# Patient Record
Sex: MALE | Race: WHITE | NOT HISPANIC OR LATINO | URBAN - METROPOLITAN AREA
[De-identification: names, ages, dates, MRNs, and addresses within clinical notes are randomized per-mention and may not be internally consistent; named-entity substitution may affect disease eponyms.]

---

## 2018-01-05 ENCOUNTER — EMERGENCY (EMERGENCY)
Facility: HOSPITAL | Age: 33
LOS: 0 days | Discharge: HOME | End: 2018-01-05
Attending: EMERGENCY MEDICINE | Admitting: EMERGENCY MEDICINE
Payer: MEDICAID

## 2018-01-05 VITALS — HEIGHT: 70 IN | WEIGHT: 216.05 LBS

## 2018-01-05 VITALS
HEART RATE: 84 BPM | RESPIRATION RATE: 16 BRPM | DIASTOLIC BLOOD PRESSURE: 55 MMHG | OXYGEN SATURATION: 99 % | SYSTOLIC BLOOD PRESSURE: 116 MMHG | TEMPERATURE: 98 F

## 2018-01-05 DIAGNOSIS — K52.89 OTHER SPECIFIED NONINFECTIVE GASTROENTERITIS AND COLITIS: ICD-10-CM

## 2018-01-05 DIAGNOSIS — R11.2 NAUSEA WITH VOMITING, UNSPECIFIED: ICD-10-CM

## 2018-01-05 LAB
ALBUMIN SERPL ELPH-MCNC: 4 G/DL — SIGNIFICANT CHANGE UP (ref 3.3–5)
ALP SERPL-CCNC: 55 U/L — SIGNIFICANT CHANGE UP (ref 40–120)
ALT FLD-CCNC: 37 U/L — SIGNIFICANT CHANGE UP (ref 12–78)
ANION GAP SERPL CALC-SCNC: 7 MMOL/L — SIGNIFICANT CHANGE UP (ref 5–17)
AST SERPL-CCNC: 26 U/L — SIGNIFICANT CHANGE UP (ref 15–37)
BASOPHILS # BLD AUTO: 0 K/UL — SIGNIFICANT CHANGE UP (ref 0–0.2)
BASOPHILS NFR BLD AUTO: 0 % — SIGNIFICANT CHANGE UP (ref 0–2)
BILIRUB SERPL-MCNC: 0.9 MG/DL — SIGNIFICANT CHANGE UP (ref 0.2–1.2)
BUN SERPL-MCNC: 18 MG/DL — SIGNIFICANT CHANGE UP (ref 7–23)
CALCIUM SERPL-MCNC: 8.9 MG/DL — SIGNIFICANT CHANGE UP (ref 8.5–10.1)
CHLORIDE SERPL-SCNC: 102 MMOL/L — SIGNIFICANT CHANGE UP (ref 96–108)
CO2 SERPL-SCNC: 29 MMOL/L — SIGNIFICANT CHANGE UP (ref 22–31)
CREAT SERPL-MCNC: 1.1 MG/DL — SIGNIFICANT CHANGE UP (ref 0.5–1.3)
EOSINOPHIL # BLD AUTO: 0 K/UL — SIGNIFICANT CHANGE UP (ref 0–0.5)
EOSINOPHIL NFR BLD AUTO: 0 % — SIGNIFICANT CHANGE UP (ref 0–6)
GLUCOSE SERPL-MCNC: 88 MG/DL — SIGNIFICANT CHANGE UP (ref 70–99)
HCT VFR BLD CALC: 51.9 % — HIGH (ref 39–50)
HGB BLD-MCNC: 17.2 G/DL — HIGH (ref 13–17)
LIDOCAIN IGE QN: 94 U/L — SIGNIFICANT CHANGE UP (ref 73–393)
LYMPHOCYTES # BLD AUTO: 0.3 K/UL — LOW (ref 1–3.3)
LYMPHOCYTES # BLD AUTO: 2 % — LOW (ref 13–44)
MCHC RBC-ENTMCNC: 28.4 PG — SIGNIFICANT CHANGE UP (ref 27–34)
MCHC RBC-ENTMCNC: 33.1 GM/DL — SIGNIFICANT CHANGE UP (ref 32–36)
MCV RBC AUTO: 85.7 FL — SIGNIFICANT CHANGE UP (ref 80–100)
MONOCYTES # BLD AUTO: 0.5 K/UL — SIGNIFICANT CHANGE UP (ref 0–0.9)
MONOCYTES NFR BLD AUTO: 7 % — SIGNIFICANT CHANGE UP (ref 2–14)
NEUTROPHILS # BLD AUTO: 7 K/UL — SIGNIFICANT CHANGE UP (ref 1.8–7.4)
NEUTROPHILS NFR BLD AUTO: 91 % — HIGH (ref 43–77)
PLAT MORPH BLD: NORMAL — SIGNIFICANT CHANGE UP
PLATELET # BLD AUTO: 186 K/UL — SIGNIFICANT CHANGE UP (ref 150–400)
POTASSIUM SERPL-MCNC: 3.9 MMOL/L — SIGNIFICANT CHANGE UP (ref 3.5–5.3)
POTASSIUM SERPL-SCNC: 3.9 MMOL/L — SIGNIFICANT CHANGE UP (ref 3.5–5.3)
PROT SERPL-MCNC: 7.8 GM/DL — SIGNIFICANT CHANGE UP (ref 6–8.3)
RBC # BLD: 6.05 M/UL — HIGH (ref 4.2–5.8)
RBC # FLD: 11.8 % — SIGNIFICANT CHANGE UP (ref 10.3–14.5)
RBC BLD AUTO: NORMAL — SIGNIFICANT CHANGE UP
SODIUM SERPL-SCNC: 138 MMOL/L — SIGNIFICANT CHANGE UP (ref 135–145)
WBC # BLD: 7.8 K/UL — SIGNIFICANT CHANGE UP (ref 3.8–10.5)
WBC # FLD AUTO: 7.8 K/UL — SIGNIFICANT CHANGE UP (ref 3.8–10.5)

## 2018-01-05 PROCEDURE — 99284 EMERGENCY DEPT VISIT MOD MDM: CPT

## 2018-01-05 RX ORDER — ONDANSETRON 8 MG/1
4 TABLET, FILM COATED ORAL ONCE
Qty: 0 | Refills: 0 | Status: COMPLETED | OUTPATIENT
Start: 2018-01-05 | End: 2018-01-05

## 2018-01-05 RX ORDER — DIPHENOXYLATE HCL/ATROPINE 2.5-.025MG
1 TABLET ORAL ONCE
Qty: 0 | Refills: 0 | Status: DISCONTINUED | OUTPATIENT
Start: 2018-01-05 | End: 2018-01-05

## 2018-01-05 RX ORDER — SODIUM CHLORIDE 9 MG/ML
1000 INJECTION INTRAMUSCULAR; INTRAVENOUS; SUBCUTANEOUS ONCE
Qty: 0 | Refills: 0 | Status: COMPLETED | OUTPATIENT
Start: 2018-01-05 | End: 2018-01-05

## 2018-01-05 RX ORDER — FAMOTIDINE 10 MG/ML
20 INJECTION INTRAVENOUS ONCE
Qty: 0 | Refills: 0 | Status: COMPLETED | OUTPATIENT
Start: 2018-01-05 | End: 2018-01-05

## 2018-01-05 RX ORDER — CETIRIZINE HYDROCHLORIDE 10 MG/1
0 TABLET ORAL
Qty: 0 | Refills: 0 | COMMUNITY

## 2018-01-05 RX ORDER — SODIUM CHLORIDE 9 MG/ML
1000 INJECTION, SOLUTION INTRAVENOUS
Qty: 0 | Refills: 0 | Status: DISCONTINUED | OUTPATIENT
Start: 2018-01-05 | End: 2018-01-05

## 2018-01-05 RX ORDER — LIDOCAINE 4 G/100G
10 CREAM TOPICAL ONCE
Qty: 0 | Refills: 0 | Status: COMPLETED | OUTPATIENT
Start: 2018-01-05 | End: 2018-01-05

## 2018-01-05 RX ORDER — PROCHLORPERAZINE MALEATE 5 MG
10 TABLET ORAL ONCE
Qty: 0 | Refills: 0 | Status: COMPLETED | OUTPATIENT
Start: 2018-01-05 | End: 2018-01-05

## 2018-01-05 RX ADMIN — FAMOTIDINE 20 MILLIGRAM(S): 10 INJECTION INTRAVENOUS at 19:06

## 2018-01-05 RX ADMIN — ONDANSETRON 4 MILLIGRAM(S): 8 TABLET, FILM COATED ORAL at 19:07

## 2018-01-05 RX ADMIN — Medication 10 MILLIGRAM(S): at 19:49

## 2018-01-05 RX ADMIN — LIDOCAINE 10 MILLILITER(S): 4 CREAM TOPICAL at 19:49

## 2018-01-05 RX ADMIN — Medication 30 MILLILITER(S): at 19:49

## 2018-01-05 RX ADMIN — SODIUM CHLORIDE 1000 MILLILITER(S): 9 INJECTION, SOLUTION INTRAVENOUS at 19:48

## 2018-01-05 RX ADMIN — SODIUM CHLORIDE 1000 MILLILITER(S): 9 INJECTION INTRAMUSCULAR; INTRAVENOUS; SUBCUTANEOUS at 18:46

## 2018-01-05 RX ADMIN — Medication 1 TABLET(S): at 19:07

## 2018-01-05 NOTE — ED ADULT TRIAGE NOTE - CHIEF COMPLAINT QUOTE
Pt c/o severe abdominal pain associated with nausea/vomiting/diarrhea x10 hrs. Pt denies recent travel hx. Denies eating any different foods than usual. States he feels very dehydrated.

## 2018-01-05 NOTE — ED ADULT NURSE NOTE - CHPI ED SYMPTOMS NEG
no hematuria/no chills/no fever/no burning urination/no blood in stool/no abdominal distension/no dysuria

## 2018-01-05 NOTE — ED ADULT NURSE REASSESSMENT NOTE - NS ED NURSE REASSESS COMMENT FT1
Normal Saline completed D5.45NS infusing at this time with no redness or swelling at the IV site.  Patient had complaints of epigastric and stomach burning.  Patient medicated for headache and epigastric burning awaiting results will continue to monitor patient condition.

## 2018-01-05 NOTE — ED STATDOCS - OBJECTIVE STATEMENT
31 y/o male with hx of hernia repair presents to the ED c/o severe abd pain, NVD numerous times starting today. +Myalgia. No bleeding. Denies fever, rash. Pt states he is unable to keep anything down, including water and feels dehydrated. Denies being around anyone being sick around his, no travel. 33 y/o male with hx of hernia repair presents to the ED c/o severe abd pain, NVD numerous times starting today. +Myalgia. No bleeding. Denies fever, rash. Pt states he is unable to keep anything down, including water and feels dehydrated. Denies being around anyone being sick around his, no travel. Pain described as cramping.

## 2018-01-05 NOTE — ED STATDOCS - PROGRESS NOTE DETAILS
RINA Alvares:   Patient has been seen, evaluated and orders have been written by the attending in intake. Patient is stable.  I will follow up the results of orders written and I will continue to evaluate/observe the patient.  Pt. with N/V/D abdominal pain since 5 am.  Numerous episodes of vomiting.   Plan for labs/IVF/Zofran and re-eval.  Alexa Alvares PA-C signed Robyn Diaz PA-C Received patient in sign out from RINA Alvares. Pt with likely viral gastroenteritis. Elevated H/H likely from dehydration. Pt feeling much better, would like to DC home. Encourage PO fluids. Pt given copy of lab results. Pt feeling well, pt and family agree with DC and plan of care.

## 2018-01-05 NOTE — ED STATDOCS - MEDICAL DECISION MAKING DETAILS
Pt presenting with NVD. Plan for labs, fluids, Zofran, Lomotal. Benign abd exam. Pt presenting with NVD. Plan for labs, fluids, Zofran, Lomotil. Benign abd exam. likely gastroenteritis.

## 2018-06-25 ENCOUNTER — EMERGENCY (EMERGENCY)
Facility: HOSPITAL | Age: 33
LOS: 0 days | Discharge: HOME | End: 2018-06-25
Attending: EMERGENCY MEDICINE | Admitting: EMERGENCY MEDICINE
Payer: MEDICAID

## 2018-06-25 VITALS
OXYGEN SATURATION: 100 % | HEART RATE: 74 BPM | SYSTOLIC BLOOD PRESSURE: 123 MMHG | RESPIRATION RATE: 18 BRPM | DIASTOLIC BLOOD PRESSURE: 67 MMHG | TEMPERATURE: 98 F

## 2018-06-25 VITALS — WEIGHT: 240.08 LBS | HEIGHT: 71 IN

## 2018-06-25 DIAGNOSIS — S61.412A LACERATION WITHOUT FOREIGN BODY OF LEFT HAND, INITIAL ENCOUNTER: ICD-10-CM
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
## 2018-06-25 DIAGNOSIS — J45.909 UNSPECIFIED ASTHMA, UNCOMPLICATED: ICD-10-CM

## 2018-06-25 DIAGNOSIS — Z79.899 OTHER LONG TERM (CURRENT) DRUG THERAPY: ICD-10-CM

## 2018-06-25 PROCEDURE — 99284 EMERGENCY DEPT VISIT MOD MDM: CPT

## 2018-06-25 PROCEDURE — 12001 RPR S/N/AX/GEN/TRNK 2.5CM/<: CPT | Mod: LT

## 2018-06-25 RX ORDER — AZTREONAM 2 G
1 VIAL (EA) INJECTION
Qty: 10 | Refills: 0 | OUTPATIENT
Start: 2018-06-25 | End: 2018-06-29

## 2018-06-25 RX ADMIN — Medication 1 TABLET(S): at 17:28

## 2018-06-25 NOTE — ED STATDOCS - OBJECTIVE STATEMENT
32 y/o male with a PMHx of asthma presents to the ED c/o puncture wound to L hand s/p injury with drill today. +Mild pain. Pt states he is able to move his hands despite the injury. Isolated injury, no other complaints at time of eval.

## 2018-06-25 NOTE — ED ADULT NURSE REASSESSMENT NOTE - NS ED NURSE REASSESS COMMENT FT1
Pt was treated, evaluated and discharged by intake provider. Please see providers notes for assessment

## 2018-06-25 NOTE — ED STATDOCS - PROGRESS NOTE DETAILS
signed Robyn Diaz PA-C Pt seen initially in intake by Dr Peck.   Pt c/o left thumb webspace lac from drill which glanced off his hand before he drilled something at home PTA. Right hand dominant. 0.5cm laceration, Gross motor/sensation intact. 2 sutures placed, pt declines analgesia. WIll prescribe abx ppx since pt is a . return precautions given. outpt f/u for suture removal. Pt feeling well, pt and family agree with DC and plan of care.

## 2018-06-25 NOTE — ED STATDOCS - ATTENDING CONTRIBUTION TO CARE
I, Favian Peck, performed the initial face to face bedside interview with this patient regarding history of present illness, review of symptoms and relevant past medical, social and family history.  I completed an independent physical examination.  I was the initial provider who evaluated this patient. I have signed out the follow up of any pending tests (i.e. labs, radiological studies) to the ACP.  I have communicated the patient’s plan of care and disposition with the ACP.  The history, relevant review of systems, past medical and surgical history, medical decision making, and physical examination was documented by the scribe in my presence and I attest to the accuracy of the documentation.

## 2018-06-25 NOTE — ED STATDOCS - SKIN, MLM
+1cm puncture/laceration through thenar eminence of L hand, bleeding has stopped but there is blood at the site.

## 2018-10-01 PROBLEM — J45.909 UNSPECIFIED ASTHMA, UNCOMPLICATED: Chronic | Status: ACTIVE | Noted: 2018-06-25

## 2018-10-09 ENCOUNTER — APPOINTMENT (OUTPATIENT)
Dept: INTERNAL MEDICINE | Facility: CLINIC | Age: 33
End: 2018-10-09
Payer: MEDICAID

## 2018-10-09 VITALS
RESPIRATION RATE: 16 BRPM | SYSTOLIC BLOOD PRESSURE: 106 MMHG | TEMPERATURE: 98.8 F | HEIGHT: 71 IN | DIASTOLIC BLOOD PRESSURE: 78 MMHG | BODY MASS INDEX: 33.04 KG/M2 | HEART RATE: 80 BPM | WEIGHT: 236 LBS | OXYGEN SATURATION: 97 %

## 2018-10-09 DIAGNOSIS — R10.9 UNSPECIFIED ABDOMINAL PAIN: ICD-10-CM

## 2018-10-09 PROCEDURE — 99385 PREV VISIT NEW AGE 18-39: CPT

## 2018-10-09 NOTE — HEALTH RISK ASSESSMENT
[Excellent] : ~his/her~  mood as  excellent [No falls in past year] : Patient reported no falls in the past year [0] : 2) Feeling down, depressed, or hopeless: Not at all (0) [None] : None [Alone] : lives alone [Employed] : employed [Single] : single [Sexually Active] : sexually active [Feels Safe at Home] : Feels safe at home [Fully functional (bathing, dressing, toileting, transferring, walking, feeding)] : Fully functional (bathing, dressing, toileting, transferring, walking, feeding) [Fully functional (using the telephone, shopping, preparing meals, housekeeping, doing laundry, using] : Fully functional and needs no help or supervision to perform IADLs (using the telephone, shopping, preparing meals, housekeeping, doing laundry, using transportation, managing medications and managing finances) [Discussed at today's visit] : Advance Directives Discussed at today's visit [Aggressive treatment] : aggressive treatment [] : No [Change in mental status noted] : No change in mental status noted [Language] : denies difficulty with language [Behavior] : denies difficulty with behavior [Learning/Retaining New Information] : denies difficulty learning/retaining new information [Handling Complex Tasks] : denies difficulty handling complex tasks [Reasoning] : denies difficulty with reasoning [Spatial Ability and Orientation] : denies difficulty with spatial ability and orientation [High Risk Behavior] : no high risk behavior [Reports changes in hearing] : Reports no changes in hearing [Reports changes in vision] : Reports no changes in vision [Reports changes in dental health] : Reports no changes in dental health

## 2018-10-09 NOTE — ASSESSMENT
[FreeTextEntry1] : Mr. Velasquez is a 33-year-old male who presents for annual physical examination. He does have some abdominal pain which may be related to peptic ulcer disease. He will take over-the-counter Prilosec and will have a gastroenterology evaluation by Dr. Yanez in. Patient will followup on a yearly basis.

## 2018-10-09 NOTE — HISTORY OF PRESENT ILLNESS
[FreeTextEntry1] : Initial annual physical examination [de-identified] : Mr. Velasquez presents for initial annual physical examination. He has a history of intermittent abdominal pain which she thinks is probably related to peptic ulcer disease. 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 He will sometimes take 2-3 tablets of Aleve per day. He has not taken Aleve in the past 2-3 weeks and has improvement in his abdominal discomfort. Mr. Parr has no history of cardiopulmonary disease. He is a former smoker.

## 2019-04-29 NOTE — ED STATDOCS - CONDITION AT DISCHARGE:
[facility] at [facility]  Aspirin 81mg daily for heart protection shortterm   Try stopping Omeprazole for a week to see if loose stools improve and car update to clinic [phone]   Call if recurrent episode of dizziness    
Improved

## 2019-08-06 DIAGNOSIS — Z00.00 ENCOUNTER FOR GENERAL ADULT MEDICAL EXAMINATION W/OUT ABNORMAL FINDINGS: ICD-10-CM

## 2019-10-08 ENCOUNTER — TRANSCRIPTION ENCOUNTER (OUTPATIENT)
Age: 34
End: 2019-10-08

## 2020-01-02 ENCOUNTER — APPOINTMENT (OUTPATIENT)
Dept: INTERNAL MEDICINE | Facility: CLINIC | Age: 35
End: 2020-01-02
Payer: MEDICAID

## 2020-01-02 VITALS
HEIGHT: 71 IN | HEART RATE: 74 BPM | SYSTOLIC BLOOD PRESSURE: 114 MMHG | TEMPERATURE: 98.5 F | OXYGEN SATURATION: 98 % | BODY MASS INDEX: 33.32 KG/M2 | RESPIRATION RATE: 18 BRPM | WEIGHT: 238 LBS | DIASTOLIC BLOOD PRESSURE: 82 MMHG

## 2020-01-02 DIAGNOSIS — Z23 ENCOUNTER FOR IMMUNIZATION: ICD-10-CM

## 2020-01-02 DIAGNOSIS — Z00.00 ENCOUNTER FOR GENERAL ADULT MEDICAL EXAMINATION W/OUT ABNORMAL FINDINGS: ICD-10-CM

## 2020-01-02 PROCEDURE — 90472 IMMUNIZATION ADMIN EACH ADD: CPT

## 2020-01-02 PROCEDURE — G0008: CPT

## 2020-01-02 PROCEDURE — 99395 PREV VISIT EST AGE 18-39: CPT | Mod: 25

## 2020-01-02 PROCEDURE — 90715 TDAP VACCINE 7 YRS/> IM: CPT

## 2020-01-02 PROCEDURE — 90686 IIV4 VACC NO PRSV 0.5 ML IM: CPT

## 2020-01-02 NOTE — HISTORY OF PRESENT ILLNESS
[FreeTextEntry1] : Annual physical examination [de-identified] : Mr. Velasquez presents for an annual physical examination. Denies any chest pain, shortness of breath palpitations. Patient has no hematuria or dysuria. Mr. Velasquez has no nocturnal symptoms of cough or shortness of breath.

## 2020-01-02 NOTE — PLAN
[FreeTextEntry1] : Mr. Velasquez presents for annual physical examination. Comprehensive blood profile was reviewed with the patient. His mild hyperlipidemia, however, he does not require statin therapy. Patient will be given a tetanus/pertussis vaccine as well as the influenza vaccine. His wife is expecting her first child within the next 2 months. Followup in one year.

## 2020-01-02 NOTE — HEALTH RISK ASSESSMENT
[Excellent] : ~his/her~ current health as excellent [Yes] : Yes [1 or 2 (0 pts)] : 1 or 2 (0 points) [2 - 4 times a month (2 pts)] : 2-4 times a month (2 points) [No falls in past year] : Patient reported no falls in the past year [Never (0 pts)] : Never (0 points) [No] : In the past 12 months have you used drugs other than those required for medical reasons? No [0] : 1) Little interest or pleasure doing things: Not at all (0) [With Family] : lives with family [None] : None [Sexually Active] : sexually active [College] : College [] :  [Feels Safe at Home] : Feels safe at home [Fully functional (using the telephone, shopping, preparing meals, housekeeping, doing laundry, using] : Fully functional and needs no help or supervision to perform IADLs (using the telephone, shopping, preparing meals, housekeeping, doing laundry, using transportation, managing medications and managing finances) [Fully functional (bathing, dressing, toileting, transferring, walking, feeding)] : Fully functional (bathing, dressing, toileting, transferring, walking, feeding) [] : No [de-identified] : none [de-identified] : None [Audit-CScore] : 2 [de-identified] : regular [[id number]] : Zero [Language] : denies difficulty with language [Change in mental status noted] : No change in mental status noted [Behavior] : denies difficulty with behavior [Learning/Retaining New Information] : denies difficulty learning/retaining new information [Reasoning] : denies difficulty with reasoning [Handling Complex Tasks] : denies difficulty handling complex tasks [Spatial Ability and Orientation] : denies difficulty with spatial ability and orientation [High Risk Behavior] : no high risk behavior

## 2020-01-02 NOTE — PHYSICAL EXAM
[No Acute Distress] : no acute distress [Well Nourished] : well nourished [Well Developed] : well developed [Normal Sclera/Conjunctiva] : normal sclera/conjunctiva [Well-Appearing] : well-appearing [EOMI] : extraocular movements intact [PERRL] : pupils equal round and reactive to light [Normal Outer Ear/Nose] : the outer ears and nose were normal in appearance [Normal Oropharynx] : the oropharynx was normal [No Lymphadenopathy] : no lymphadenopathy [No JVD] : no jugular venous distention [Supple] : supple [Thyroid Normal, No Nodules] : the thyroid was normal and there were no nodules present [No Respiratory Distress] : no respiratory distress  [Clear to Auscultation] : lungs were clear to auscultation bilaterally [No Accessory Muscle Use] : no accessory muscle use [Normal Rate] : normal rate  [Regular Rhythm] : with a regular rhythm [No Murmur] : no murmur heard [Normal S1, S2] : normal S1 and S2 [No Carotid Bruits] : no carotid bruits [No Abdominal Bruit] : a ~M bruit was not heard ~T in the abdomen [Pedal Pulses Present] : the pedal pulses are present [No Varicosities] : no varicosities [No Edema] : there was no peripheral edema [No Palpable Aorta] : no palpable aorta [Soft] : abdomen soft [Non Tender] : non-tender [No Extremity Clubbing/Cyanosis] : no extremity clubbing/cyanosis [Non-distended] : non-distended [No Masses] : no abdominal mass palpated [No HSM] : no HSM [Normal Bowel Sounds] : normal bowel sounds [No CVA Tenderness] : no CVA  tenderness [Normal Posterior Cervical Nodes] : no posterior cervical lymphadenopathy [Normal Anterior Cervical Nodes] : no anterior cervical lymphadenopathy [Grossly Normal Strength/Tone] : grossly normal strength/tone [No Joint Swelling] : no joint swelling [No Spinal Tenderness] : no spinal tenderness [No Rash] : no rash [Coordination Grossly Intact] : coordination grossly intact [No Focal Deficits] : no focal deficits [Normal Gait] : normal gait [Normal Affect] : the affect was normal [Normal Insight/Judgement] : insight and judgment were intact [Deep Tendon Reflexes (DTR)] : deep tendon reflexes were 2+ and symmetric

## 2020-01-13 ENCOUNTER — TRANSCRIPTION ENCOUNTER (OUTPATIENT)
Age: 35
End: 2020-01-13

## 2020-01-16 ENCOUNTER — EMERGENCY (EMERGENCY)
Facility: HOSPITAL | Age: 35
LOS: 0 days | Discharge: HOME | End: 2020-01-16
Attending: EMERGENCY MEDICINE
Payer: MEDICAID

## 2020-01-16 ENCOUNTER — TRANSCRIPTION ENCOUNTER (OUTPATIENT)
Age: 35
End: 2020-01-16

## 2020-01-16 VITALS
SYSTOLIC BLOOD PRESSURE: 116 MMHG | HEART RATE: 72 BPM | RESPIRATION RATE: 18 BRPM | OXYGEN SATURATION: 99 % | DIASTOLIC BLOOD PRESSURE: 94 MMHG | TEMPERATURE: 98 F

## 2020-01-16 VITALS — WEIGHT: 240.08 LBS | HEIGHT: 70 IN

## 2020-01-16 DIAGNOSIS — R20.2 PARESTHESIA OF SKIN: ICD-10-CM

## 2020-01-16 DIAGNOSIS — J45.909 UNSPECIFIED ASTHMA, UNCOMPLICATED: ICD-10-CM

## 2020-01-16 DIAGNOSIS — G51.0 BELL'S PALSY: ICD-10-CM

## 2020-01-16 LAB
ALBUMIN SERPL ELPH-MCNC: 4.1 G/DL — SIGNIFICANT CHANGE UP (ref 3.3–5)
ALP SERPL-CCNC: 63 U/L — SIGNIFICANT CHANGE UP (ref 40–120)
ALT FLD-CCNC: 31 U/L — SIGNIFICANT CHANGE UP (ref 12–78)
ANION GAP SERPL CALC-SCNC: 5 MMOL/L — SIGNIFICANT CHANGE UP (ref 5–17)
AST SERPL-CCNC: 19 U/L — SIGNIFICANT CHANGE UP (ref 15–37)
BASOPHILS # BLD AUTO: 0.03 K/UL — SIGNIFICANT CHANGE UP (ref 0–0.2)
BASOPHILS NFR BLD AUTO: 0.3 % — SIGNIFICANT CHANGE UP (ref 0–2)
BILIRUB SERPL-MCNC: 0.4 MG/DL — SIGNIFICANT CHANGE UP (ref 0.2–1.2)
BUN SERPL-MCNC: 16 MG/DL — SIGNIFICANT CHANGE UP (ref 7–23)
CALCIUM SERPL-MCNC: 8.7 MG/DL — SIGNIFICANT CHANGE UP (ref 8.5–10.1)
CHLORIDE SERPL-SCNC: 105 MMOL/L — SIGNIFICANT CHANGE UP (ref 96–108)
CO2 SERPL-SCNC: 28 MMOL/L — SIGNIFICANT CHANGE UP (ref 22–31)
CREAT SERPL-MCNC: 1.13 MG/DL — SIGNIFICANT CHANGE UP (ref 0.5–1.3)
EOSINOPHIL # BLD AUTO: 0.29 K/UL — SIGNIFICANT CHANGE UP (ref 0–0.5)
EOSINOPHIL NFR BLD AUTO: 3.2 % — SIGNIFICANT CHANGE UP (ref 0–6)
GLUCOSE SERPL-MCNC: 88 MG/DL — SIGNIFICANT CHANGE UP (ref 70–99)
HCT VFR BLD CALC: 46.6 % — SIGNIFICANT CHANGE UP (ref 39–50)
HGB BLD-MCNC: 15.2 G/DL — SIGNIFICANT CHANGE UP (ref 13–17)
IMM GRANULOCYTES NFR BLD AUTO: 0.4 % — SIGNIFICANT CHANGE UP (ref 0–1.5)
LYMPHOCYTES # BLD AUTO: 1.57 K/UL — SIGNIFICANT CHANGE UP (ref 1–3.3)
LYMPHOCYTES # BLD AUTO: 17.3 % — SIGNIFICANT CHANGE UP (ref 13–44)
MCHC RBC-ENTMCNC: 28.1 PG — SIGNIFICANT CHANGE UP (ref 27–34)
MCHC RBC-ENTMCNC: 32.6 GM/DL — SIGNIFICANT CHANGE UP (ref 32–36)
MCV RBC AUTO: 86.1 FL — SIGNIFICANT CHANGE UP (ref 80–100)
MONOCYTES # BLD AUTO: 0.5 K/UL — SIGNIFICANT CHANGE UP (ref 0–0.9)
MONOCYTES NFR BLD AUTO: 5.5 % — SIGNIFICANT CHANGE UP (ref 2–14)
NEUTROPHILS # BLD AUTO: 6.62 K/UL — SIGNIFICANT CHANGE UP (ref 1.8–7.4)
NEUTROPHILS NFR BLD AUTO: 73.3 % — SIGNIFICANT CHANGE UP (ref 43–77)
PLATELET # BLD AUTO: 224 K/UL — SIGNIFICANT CHANGE UP (ref 150–400)
POTASSIUM SERPL-MCNC: 3.7 MMOL/L — SIGNIFICANT CHANGE UP (ref 3.5–5.3)
POTASSIUM SERPL-SCNC: 3.7 MMOL/L — SIGNIFICANT CHANGE UP (ref 3.5–5.3)
PROT SERPL-MCNC: 7.4 GM/DL — SIGNIFICANT CHANGE UP (ref 6–8.3)
RBC # BLD: 5.41 M/UL — SIGNIFICANT CHANGE UP (ref 4.2–5.8)
RBC # FLD: 13.1 % — SIGNIFICANT CHANGE UP (ref 10.3–14.5)
SODIUM SERPL-SCNC: 138 MMOL/L — SIGNIFICANT CHANGE UP (ref 135–145)
WBC # BLD: 9.05 K/UL — SIGNIFICANT CHANGE UP (ref 3.8–10.5)
WBC # FLD AUTO: 9.05 K/UL — SIGNIFICANT CHANGE UP (ref 3.8–10.5)

## 2020-01-16 PROCEDURE — 93005 ELECTROCARDIOGRAM TRACING: CPT

## 2020-01-16 PROCEDURE — 80053 COMPREHEN METABOLIC PANEL: CPT

## 2020-01-16 PROCEDURE — 85025 COMPLETE CBC W/AUTO DIFF WBC: CPT

## 2020-01-16 PROCEDURE — 99283 EMERGENCY DEPT VISIT LOW MDM: CPT

## 2020-01-16 PROCEDURE — 93010 ELECTROCARDIOGRAM REPORT: CPT

## 2020-01-16 PROCEDURE — 99284 EMERGENCY DEPT VISIT MOD MDM: CPT | Mod: 25

## 2020-01-16 PROCEDURE — 70450 CT HEAD/BRAIN W/O DYE: CPT | Mod: 26

## 2020-01-16 PROCEDURE — 86618 LYME DISEASE ANTIBODY: CPT

## 2020-01-16 PROCEDURE — 70450 CT HEAD/BRAIN W/O DYE: CPT

## 2020-01-16 PROCEDURE — 36415 COLL VENOUS BLD VENIPUNCTURE: CPT

## 2020-01-16 RX ORDER — VALACYCLOVIR 500 MG/1
1 TABLET, FILM COATED ORAL
Qty: 21 | Refills: 0
Start: 2020-01-16 | End: 2020-01-22

## 2020-01-16 NOTE — ED STATDOCS - ATTENDING CONTRIBUTION TO CARE
I, Tressa Wallace MD,  performed the initial face to face bedside interview with this patient regarding history of present illness, review of symptoms and relevant past medical, social and family history.  I completed an independent physical examination.  I was the initial provider who evaluated this patient. I have signed out the follow up of any pending tests (i.e. labs, radiological studies) to the ACP.  I have communicated the patient’s plan of care and disposition with the ACP.  The history, relevant review of systems, past medical and surgical history, medical decision making, and physical examination was documented by the scribe in my presence and I attest to the accuracy of the documentation.

## 2020-01-16 NOTE — ED STATDOCS - NSFOLLOWUPINSTRUCTIONS_ED_ALL_ED_FT
Log Out.    [employer] CareNotes®     :  [facility]             CASTANEDA PALSY - AfterCare(R) Instructions(ER/ED)     Castaneda Palsy    WHAT YOU NEED TO KNOW:    Bell palsy is a sudden weakness or paralysis of one side of your face. Bell palsy occurs when the nerve that controls the muscles in your face becomes swollen or irritated.     DISCHARGE INSTRUCTIONS:    Medicines:     Medicine may be given to decrease swelling and irritation of your facial nerve. You may receive antiviral medicine if your healthcare provider thinks a virus caused your Bell palsy. Your healthcare provider may also suggest acetaminophen or ibuprofen to reduce pain. These medicines are available without a doctor's order. Ask which medicine to take, and how much you need. Follow directions. Acetaminophen can cause liver damage, and ibuprofen can cause stomach bleeding or kidney damage.       Take your medicine as directed. Contact your healthcare provider if you think your medicine is not helping or if you have side effects. Tell him if you are allergic to any medicine. Keep a list of the medicines, vitamins, and herbs you take. Include the amounts, and when and why you take them. Bring the list or the pill bottles to follow-up visits. Carry your medicine list with you in case of an emergency.    Follow up with your healthcare provider as directed: Write down your questions so you remember to ask them during your visits.    Eye care: Your healthcare provider may recommend that you use artificial tears during the day to keep your eye moist. You may need to use an eye ointment at night. You may also need to wear a patch over your eye and tape it shut while you sleep. This helps keep your eye from getting dry and infected. Wear sunglasses to protect your eye from direct sunlight. Stay away from places that have fumes, dust, or other particles in the air that may harm your eye.    Physical therapy: A physical therapist may teach you how to massage your face and exercise the nerves and muscles in your face. This may help you get better sooner and prevent long-term problems. You can exercise on your own when your facial movement begins to return. Open and close your eye, wink, and smile wide. Do the exercises for 15 or 20 minutes several times a day.    Contact your healthcare provider if:     You have a fever.      Your eye becomes red, irritated, or painful.      You have questions or concerns about your condition or care.    Return to the emergency department if:     You develop weakness or numbness on one side of your body (other than your face).      You have double vision, or you lose vision in your eye.      You have trouble thinking clearly.

## 2020-01-16 NOTE — ED ADULT NURSE NOTE - OBJECTIVE STATEMENT
pt presents to the ED c/o right sided facial numbness and weakness since x2 days ago. Pt also reports loss of taste since 1/11/20. Denies fever, chills, any other acute c/o. Pt seen by PCP, Dr. Ousmane Torres, sent in for r/o CVA. Pt received this season's flu shot x2 weeks ago. Reports getting over head cold recently. Pt AOx4, breathing symmetrical and unlabored, in no acute distress at this time.

## 2020-01-16 NOTE — ED STATDOCS - OBJECTIVE STATEMENT
33 y/o m with PMHx of asthma presenting to the ED c/o right sided facial numbness and weakness since x2 days ago. Pt also reports loss of taste since 1/11/20. Denies fever, chills, any other acute c/o. Pt seen by PCP, Dr. Ousmane Torres, sent in for r/o CVA. Pt received this season's flu shot x2 weeks ago. Reports getting over head cold recently. Nonsmoker, no EtOH or recreational drug use.

## 2020-01-16 NOTE — ED ADULT NURSE NOTE - NSIMPLEMENTINTERV_GEN_ALL_ED
Implemented All Universal Safety Interventions:  [place] to call system. Call bell, personal items and telephone within reach. Instruct patient to call for assistance. Room bathroom lighting operational. Non-slip footwear when patient is off stretcher. Physically safe environment: no spills, clutter or unnecessary equipment. Stretcher in lowest position, wheels locked, appropriate side rails in place.

## 2020-01-16 NOTE — ED STATDOCS - CARE PROVIDER_API CALL
Ventura Watters)  Neurology  [address] [facility], [address] [place] [address]  Phone: [phone]  Fax: [phone]  Follow Up Time:

## 2020-01-16 NOTE — ED STATDOCS - PROGRESS NOTE DETAILS
33 y/o m with PMHx of asthma presenting to the ED c/o right sided facial numbness and weakness since x2 days ago. Pt also reports loss of taste since 1/11/20. Denies fever, chills, any other acute c/o. plan: ct rreval. -Shereen Rivera PA-C pt aware of ct results and to fu neurology and pmd and to return to ed for any worsening of symptoms. pt agrees with plan. -Shereen Rivera PA-C

## 2020-01-16 NOTE — ED ADULT TRIAGE NOTE - CHIEF COMPLAINT QUOTE
pt presents to Ed with complaints fo right sided facial numbness and loss of movement. pt states symptoms started on Tuesday. pt also endorses loss of taste starting Saturday.

## 2020-01-16 NOTE — ED STATDOCS - PATIENT PORTAL LINK FT
You can access the FollowMyHealth Patient Portal offered by  by registering at the following website: [email or web address]. By joining [employer]’s FollowMyHealth portal, you will also be able to view your health information using other applications (apps) compatible with our system.

## 2020-01-16 NOTE — ED STATDOCS - NEUROLOGICAL, MLM
+right sided facial droop, decreased furrowing, decreased ability to close right eye, right sided facial droop, negative rhomberg, 5x5 strength in all extremities +right sided facial droop, decreased furrowing right forehead, decreased ability to close right eye, right sided facial droop, negative rhomberg, 5x5 strength in all extremities

## 2020-01-17 LAB — LYME C6 AB IGG/IGM EIA REFLEX WESTERN BL: SIGNIFICANT CHANGE UP

## 2020-02-26 ENCOUNTER — APPOINTMENT (OUTPATIENT)
Dept: NEUROLOGY | Facility: CLINIC | Age: 35
End: 2020-02-26

## 2021-02-17 ENCOUNTER — TRANSCRIPTION ENCOUNTER (OUTPATIENT)
Age: 36
End: 2021-02-17

## 2021-04-10 ENCOUNTER — TRANSCRIPTION ENCOUNTER (OUTPATIENT)
Age: 36
End: 2021-04-10

## 2021-08-05 ENCOUNTER — EMERGENCY (EMERGENCY)
Facility: HOSPITAL | Age: 36
LOS: 0 days | Discharge: HOME | End: 2021-08-05
Attending: EMERGENCY MEDICINE
Payer: MEDICAID

## 2021-08-05 VITALS
OXYGEN SATURATION: 100 % | HEART RATE: 70 BPM | RESPIRATION RATE: 18 BRPM | DIASTOLIC BLOOD PRESSURE: 78 MMHG | SYSTOLIC BLOOD PRESSURE: 122 MMHG | TEMPERATURE: 98 F

## 2021-08-05 VITALS
OXYGEN SATURATION: 97 % | HEIGHT: 70 IN | TEMPERATURE: 98 F | HEART RATE: 65 BPM | RESPIRATION RATE: 17 BRPM | DIASTOLIC BLOOD PRESSURE: 86 MMHG | SYSTOLIC BLOOD PRESSURE: 114 MMHG

## 2021-08-05 DIAGNOSIS — Z88.1 ALLERGY STATUS TO OTHER ANTIBIOTIC AGENTS STATUS: ICD-10-CM

## 2021-08-05 DIAGNOSIS — R42 DIZZINESS AND GIDDINESS: ICD-10-CM

## 2021-08-05 DIAGNOSIS — R55 SYNCOPE AND COLLAPSE: ICD-10-CM

## 2021-08-05 DIAGNOSIS — J45.909 UNSPECIFIED ASTHMA, UNCOMPLICATED: ICD-10-CM

## 2021-08-05 DIAGNOSIS — R11.0 NAUSEA: ICD-10-CM

## 2021-08-05 DIAGNOSIS — R61 GENERALIZED HYPERHIDROSIS: ICD-10-CM

## 2021-08-05 DIAGNOSIS — R07.9 CHEST PAIN, UNSPECIFIED: ICD-10-CM

## 2021-08-05 LAB
ALBUMIN SERPL ELPH-MCNC: 3.7 G/DL — SIGNIFICANT CHANGE UP (ref 3.3–5)
ALP SERPL-CCNC: 59 U/L — SIGNIFICANT CHANGE UP (ref 40–120)
ALT FLD-CCNC: 27 U/L — SIGNIFICANT CHANGE UP (ref 12–78)
ANION GAP SERPL CALC-SCNC: 8 MMOL/L — SIGNIFICANT CHANGE UP (ref 5–17)
APPEARANCE UR: CLEAR — SIGNIFICANT CHANGE UP
AST SERPL-CCNC: 22 U/L — SIGNIFICANT CHANGE UP (ref 15–37)
BASOPHILS # BLD AUTO: 0.03 K/UL — SIGNIFICANT CHANGE UP (ref 0–0.2)
BASOPHILS NFR BLD AUTO: 0.4 % — SIGNIFICANT CHANGE UP (ref 0–2)
BILIRUB SERPL-MCNC: 0.4 MG/DL — SIGNIFICANT CHANGE UP (ref 0.2–1.2)
BILIRUB UR-MCNC: NEGATIVE — SIGNIFICANT CHANGE UP
BUN SERPL-MCNC: 12 MG/DL — SIGNIFICANT CHANGE UP (ref 7–23)
CALCIUM SERPL-MCNC: 8.5 MG/DL — SIGNIFICANT CHANGE UP (ref 8.5–10.1)
CHLORIDE SERPL-SCNC: 107 MMOL/L — SIGNIFICANT CHANGE UP (ref 96–108)
CO2 SERPL-SCNC: 25 MMOL/L — SIGNIFICANT CHANGE UP (ref 22–31)
COLOR SPEC: YELLOW — SIGNIFICANT CHANGE UP
CREAT SERPL-MCNC: 0.94 MG/DL — SIGNIFICANT CHANGE UP (ref 0.5–1.3)
D DIMER BLD IA.RAPID-MCNC: <150 NG/ML DDU — SIGNIFICANT CHANGE UP
DIFF PNL FLD: NEGATIVE — SIGNIFICANT CHANGE UP
EOSINOPHIL # BLD AUTO: 0.17 K/UL — SIGNIFICANT CHANGE UP (ref 0–0.5)
EOSINOPHIL NFR BLD AUTO: 2.4 % — SIGNIFICANT CHANGE UP (ref 0–6)
GLUCOSE SERPL-MCNC: 88 MG/DL — SIGNIFICANT CHANGE UP (ref 70–99)
GLUCOSE UR QL: NEGATIVE MG/DL — SIGNIFICANT CHANGE UP
HCT VFR BLD CALC: 40.6 % — SIGNIFICANT CHANGE UP (ref 39–50)
HGB BLD-MCNC: 13.9 G/DL — SIGNIFICANT CHANGE UP (ref 13–17)
IMM GRANULOCYTES NFR BLD AUTO: 0.4 % — SIGNIFICANT CHANGE UP (ref 0–1.5)
KETONES UR-MCNC: NEGATIVE — SIGNIFICANT CHANGE UP
LEUKOCYTE ESTERASE UR-ACNC: NEGATIVE — SIGNIFICANT CHANGE UP
LYMPHOCYTES # BLD AUTO: 1.46 K/UL — SIGNIFICANT CHANGE UP (ref 1–3.3)
LYMPHOCYTES # BLD AUTO: 20.9 % — SIGNIFICANT CHANGE UP (ref 13–44)
MAGNESIUM SERPL-MCNC: 2 MG/DL — SIGNIFICANT CHANGE UP (ref 1.6–2.6)
MCHC RBC-ENTMCNC: 28.8 PG — SIGNIFICANT CHANGE UP (ref 27–34)
MCHC RBC-ENTMCNC: 34.2 GM/DL — SIGNIFICANT CHANGE UP (ref 32–36)
MCV RBC AUTO: 84.2 FL — SIGNIFICANT CHANGE UP (ref 80–100)
MONOCYTES # BLD AUTO: 0.55 K/UL — SIGNIFICANT CHANGE UP (ref 0–0.9)
MONOCYTES NFR BLD AUTO: 7.9 % — SIGNIFICANT CHANGE UP (ref 2–14)
NEUTROPHILS # BLD AUTO: 4.73 K/UL — SIGNIFICANT CHANGE UP (ref 1.8–7.4)
NEUTROPHILS NFR BLD AUTO: 68 % — SIGNIFICANT CHANGE UP (ref 43–77)
NITRITE UR-MCNC: NEGATIVE — SIGNIFICANT CHANGE UP
NT-PROBNP SERPL-SCNC: 79 PG/ML — SIGNIFICANT CHANGE UP (ref 0–125)
PH UR: 7 — SIGNIFICANT CHANGE UP (ref 5–8)
PLATELET # BLD AUTO: 187 K/UL — SIGNIFICANT CHANGE UP (ref 150–400)
POTASSIUM SERPL-MCNC: 3.5 MMOL/L — SIGNIFICANT CHANGE UP (ref 3.5–5.3)
POTASSIUM SERPL-SCNC: 3.5 MMOL/L — SIGNIFICANT CHANGE UP (ref 3.5–5.3)
PROT SERPL-MCNC: 6.5 GM/DL — SIGNIFICANT CHANGE UP (ref 6–8.3)
PROT UR-MCNC: NEGATIVE MG/DL — SIGNIFICANT CHANGE UP
RBC # BLD: 4.82 M/UL — SIGNIFICANT CHANGE UP (ref 4.2–5.8)
RBC # FLD: 12.7 % — SIGNIFICANT CHANGE UP (ref 10.3–14.5)
SODIUM SERPL-SCNC: 140 MMOL/L — SIGNIFICANT CHANGE UP (ref 135–145)
SP GR SPEC: 1.01 — SIGNIFICANT CHANGE UP (ref 1.01–1.02)
TROPONIN I SERPL-MCNC: <0.015 NG/ML — SIGNIFICANT CHANGE UP (ref 0.01–0.04)
TROPONIN I SERPL-MCNC: <0.015 NG/ML — SIGNIFICANT CHANGE UP (ref 0.01–0.04)
UROBILINOGEN FLD QL: NEGATIVE MG/DL — SIGNIFICANT CHANGE UP
WBC # BLD: 6.97 K/UL — SIGNIFICANT CHANGE UP (ref 3.8–10.5)
WBC # FLD AUTO: 6.97 K/UL — SIGNIFICANT CHANGE UP (ref 3.8–10.5)

## 2021-08-05 PROCEDURE — 71045 X-RAY EXAM CHEST 1 VIEW: CPT

## 2021-08-05 PROCEDURE — 36415 COLL VENOUS BLD VENIPUNCTURE: CPT

## 2021-08-05 PROCEDURE — 81003 URINALYSIS AUTO W/O SCOPE: CPT

## 2021-08-05 PROCEDURE — 99285 EMERGENCY DEPT VISIT HI MDM: CPT

## 2021-08-05 PROCEDURE — 93005 ELECTROCARDIOGRAM TRACING: CPT

## 2021-08-05 PROCEDURE — 80053 COMPREHEN METABOLIC PANEL: CPT

## 2021-08-05 PROCEDURE — 70450 CT HEAD/BRAIN W/O DYE: CPT | Mod: 26,MA

## 2021-08-05 PROCEDURE — 70450 CT HEAD/BRAIN W/O DYE: CPT

## 2021-08-05 PROCEDURE — 99284 EMERGENCY DEPT VISIT MOD MDM: CPT | Mod: 25

## 2021-08-05 PROCEDURE — 83735 ASSAY OF MAGNESIUM: CPT

## 2021-08-05 PROCEDURE — 85025 COMPLETE CBC W/AUTO DIFF WBC: CPT

## 2021-08-05 PROCEDURE — 71045 X-RAY EXAM CHEST 1 VIEW: CPT | Mod: 26

## 2021-08-05 PROCEDURE — 83880 ASSAY OF NATRIURETIC PEPTIDE: CPT

## 2021-08-05 PROCEDURE — 85379 FIBRIN DEGRADATION QUANT: CPT

## 2021-08-05 PROCEDURE — 84484 ASSAY OF TROPONIN QUANT: CPT

## 2021-08-05 PROCEDURE — 93010 ELECTROCARDIOGRAM REPORT: CPT

## 2021-08-05 RX ORDER — MECLIZINE HCL 12.5 MG
25 TABLET ORAL ONCE
Refills: 0 | Status: COMPLETED | OUTPATIENT
Start: 2021-08-05 | End: 2021-08-05

## 2021-08-05 RX ORDER — MECLIZINE HCL 12.5 MG
1 TABLET ORAL
Qty: 15 | Refills: 0
Start: 2021-08-05 | End: 2021-08-09

## 2021-08-05 RX ORDER — ASPIRIN/CALCIUM CARB/MAGNESIUM 324 MG
324 TABLET ORAL ONCE
Refills: 0 | Status: DISCONTINUED | OUTPATIENT
Start: 2021-08-05 | End: 2021-08-05

## 2021-08-05 RX ORDER — SODIUM CHLORIDE 9 MG/ML
1000 INJECTION INTRAMUSCULAR; INTRAVENOUS; SUBCUTANEOUS ONCE
Refills: 0 | Status: COMPLETED | OUTPATIENT
Start: 2021-08-05 | End: 2021-08-05

## 2021-08-05 RX ADMIN — SODIUM CHLORIDE 1000 MILLILITER(S): 9 INJECTION INTRAMUSCULAR; INTRAVENOUS; SUBCUTANEOUS at 14:57

## 2021-08-05 RX ADMIN — Medication 25 MILLIGRAM(S): at 15:53

## 2021-08-05 NOTE — ED PROVIDER NOTE - OBJECTIVE STATEMENT
35 y/o male with a PMHx of asthma presents to the ED s/p syncopal episode. States he went to work and  felt dizzy . Has a hard time to stand and felt pain in chest. +nausea. +diaphoresis. No hx of similar symptoms.  Denies leg swelling, SOB . No recent travel. No strike to head. No hx of drinking or smoking. NKDA. 35 y/o male with a PMHx of asthma presents to the ED s/p syncopal episode. States he went to work and  felt dizzy .  felt pain in chest. +nausea. +diaphoresis. No hx of similar symptoms.  Denies leg swelling, SOB . No recent travel. No strike to head. No hx of drinking or smoking. NKDA.

## 2021-08-05 NOTE — ED PROVIDER NOTE - NS_ ATTENDINGSCRIBEDETAILS _ED_A_ED_FT
I, Tip Thomas MD,  performed the initial face to face bedside interview with this patient regarding history of present illness, review of symptoms and relevant past medical, social and family history.  I completed an independent physical examination.  I was the initial provider who evaluated this patient.  The history, relevant review of systems, past medical and surgical history, medical decision making, and physical examination was documented by the scribe in my presence and I attest to the accuracy of the documentation.

## 2021-08-05 NOTE — ED PROVIDER NOTE - CLINICAL SUMMARY MEDICAL DECISION MAKING FREE TEXT BOX
37 y/o male with no pertinent PMHx presents to the ED with dizziness, lightheadedness, CP, diaphoresis, nausea. Had syncopal episode. Exam is  non focal. Concern for ACS. Will trop and reassess. Low suspicion for dissection and PE. 35 y/o male with no pertinent PMHx presents to the ED with dizziness, lightheadedness, CP, diaphoresis, nausea. Had syncopal episode. Exam is  non focal. Concern for ACS. Will trop and reassess. Low suspicion for dissection and PE. heart score 1

## 2021-08-05 NOTE — ED PROVIDER NOTE - PHYSICAL EXAMINATION
Constitutional: NAD AAOx3  Eyes: PERRLA EOMI  Head: Normocephalic atraumatic  Mouth: MMM  Cardiac: regular rate , normal peripheral pulses.   Resp: Lungs CTAB  GI: Abd s/nt/nd, no rebound or guarding.  Neuro: awake, alert, moving all extremities, cranial nerves 2-12 intact, sensation intact, no dysmetria.  Skin: No rashes   MSK : no lower extremity  swelling Constitutional: NAD AAOx3  Eyes: PERRLA EOMI  Head: Normocephalic atraumatic  Mouth: MMM  Cardiac: regular rate , normal peripheral pulses.   Resp: Lungs CTAB  GI: Abd s/nt/nd, no rebound or guarding.  Neuro: awake, alert, moving all extremities, cranial nerves 2-12 intact, sensation intact,   Skin: No rashes   MSK : no lower extremity  swelling Constitutional: NAD AAOx3  Eyes: PERRLA EOMI  Head: Normocephalic atraumatic  Mouth: MMM  Cardiac: regular rate , normal peripheral pulses.   Resp: Lungs CTAB  GI: Abd s/nt/nd, no rebound or guarding.  Neuro: awake, alert, moving all extremities, cranial nerves 2-12 intact, normal strength sensation coordination NIH-0  Skin: No rashes   MSK : no lower extremity  swelling

## 2021-08-05 NOTE — ED PROVIDER NOTE - CARE PROVIDER_API CALL
Chris Carver (DO)  Cardiology  [address] [place] [address]  Phone: [phone]  Fax: [phone]  Follow Up Time: 1-3 Days

## 2021-08-05 NOTE — ED PROVIDER NOTE - NSFOLLOWUPINSTRUCTIONS_ED_ALL_ED_FT
Continue Antivert 25mg three times daily. Drink lots of fluids. Follow up with your doctor. Return to ER if worse.

## 2021-08-05 NOTE — ED PROVIDER NOTE - NS ED ROS FT
Constitutional: No fever or chills  Eyes: No visual changes  HEENT: No throat pain  CV: +chest pain  Resp: No SOB no cough  GI: No abd pain or vomiting. +nausea  : No dysuria  MSK: No musculoskeletal pain. no leg swelling.  Skin: No rash. +diaphoresis  Neuro: No headache Constitutional: No fever or chills  Eyes: No visual changes  HEENT: No throat pain  CV: +chest pain  Resp: No SOB no cough  GI: No abd pain or vomiting. +nausea  : No dysuria  MSK: No musculoskeletal pain. no leg swelling.  Skin: No rash. +diaphoresis  Neuro: No headache + dizziness

## 2021-08-05 NOTE — ED ADULT NURSE NOTE - OBJECTIVE STATEMENT
Pt presents to ED for syncope. 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 Pt then laid down and reports he must have "knocked out". Pt states he woke up approx an hr after syncope. Pt reports dizziness accompanied with cp has been ongoing x 1 year. Upon arrival to ED, pt still dizzy when attempting to get up. CP 3/10. Denies SOB. Denies any drug or alcohol use.

## 2021-08-05 NOTE — ED ADULT TRIAGE NOTE - CHIEF COMPLAINT QUOTE
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 Pt states he has had vertigo for the past year but has gone untreated, pt reports awakening with chest pain this morning that progressed. 324 asa given PTA with 1 nitroglycerin. No trauma from syncope noted.

## 2021-08-05 NOTE — ED PROVIDER NOTE - PATIENT PORTAL LINK FT
You can access the FollowMyHealth Patient Portal offered by [facility] by registering at the following website: [email or web address]. By joining [employer]’s FollowMyHealth portal, you will also be able to view your health information using other applications (apps) compatible with our system.

## 2021-10-19 ENCOUNTER — TRANSCRIPTION ENCOUNTER (OUTPATIENT)
Age: 36
End: 2021-10-19

## 2022-05-15 ENCOUNTER — NON-APPOINTMENT (OUTPATIENT)
Age: 37
End: 2022-05-15

## 2022-05-17 ENCOUNTER — EMERGENCY (EMERGENCY)
Facility: HOSPITAL | Age: 37
LOS: 0 days | Discharge: HOME | End: 2022-05-17
Attending: EMERGENCY MEDICINE
Payer: MEDICAID

## 2022-05-17 VITALS
SYSTOLIC BLOOD PRESSURE: 113 MMHG | DIASTOLIC BLOOD PRESSURE: 54 MMHG | RESPIRATION RATE: 15 BRPM | OXYGEN SATURATION: 99 % | HEART RATE: 67 BPM | TEMPERATURE: 98 F

## 2022-05-17 VITALS
SYSTOLIC BLOOD PRESSURE: 122 MMHG | OXYGEN SATURATION: 98 % | WEIGHT: 229.94 LBS | HEIGHT: 70 IN | DIASTOLIC BLOOD PRESSURE: 68 MMHG | TEMPERATURE: 98 F | RESPIRATION RATE: 18 BRPM | HEART RATE: 86 BPM

## 2022-05-17 DIAGNOSIS — Z88.1 ALLERGY STATUS TO OTHER ANTIBIOTIC AGENTS STATUS: ICD-10-CM

## 2022-05-17 DIAGNOSIS — J45.909 UNSPECIFIED ASTHMA, UNCOMPLICATED: ICD-10-CM

## 2022-05-17 DIAGNOSIS — R06.02 SHORTNESS OF BREATH: ICD-10-CM

## 2022-05-17 DIAGNOSIS — Z20.822 CONTACT WITH AND (SUSPECTED) EXPOSURE TO COVID-19: ICD-10-CM

## 2022-05-17 DIAGNOSIS — R19.7 DIARRHEA, UNSPECIFIED: ICD-10-CM

## 2022-05-17 DIAGNOSIS — B34.9 VIRAL INFECTION, UNSPECIFIED: ICD-10-CM

## 2022-05-17 DIAGNOSIS — R05.9 COUGH, UNSPECIFIED: ICD-10-CM

## 2022-05-17 LAB
ALBUMIN SERPL ELPH-MCNC: 3.7 G/DL — SIGNIFICANT CHANGE UP (ref 3.3–5)
ALP SERPL-CCNC: 73 U/L — SIGNIFICANT CHANGE UP (ref 40–120)
ALT FLD-CCNC: 29 U/L — SIGNIFICANT CHANGE UP (ref 12–78)
ANION GAP SERPL CALC-SCNC: 9 MMOL/L — SIGNIFICANT CHANGE UP (ref 5–17)
AST SERPL-CCNC: 18 U/L — SIGNIFICANT CHANGE UP (ref 15–37)
BASOPHILS # BLD AUTO: 0.03 K/UL — SIGNIFICANT CHANGE UP (ref 0–0.2)
BASOPHILS NFR BLD AUTO: 0.3 % — SIGNIFICANT CHANGE UP (ref 0–2)
BILIRUB SERPL-MCNC: 0.7 MG/DL — SIGNIFICANT CHANGE UP (ref 0.2–1.2)
BUN SERPL-MCNC: 10 MG/DL — SIGNIFICANT CHANGE UP (ref 7–23)
CALCIUM SERPL-MCNC: 8.9 MG/DL — SIGNIFICANT CHANGE UP (ref 8.5–10.1)
CHLORIDE SERPL-SCNC: 106 MMOL/L — SIGNIFICANT CHANGE UP (ref 96–108)
CO2 SERPL-SCNC: 23 MMOL/L — SIGNIFICANT CHANGE UP (ref 22–31)
CREAT SERPL-MCNC: 1 MG/DL — SIGNIFICANT CHANGE UP (ref 0.5–1.3)
EGFR: 100 ML/MIN/1.73M2 — SIGNIFICANT CHANGE UP
EOSINOPHIL # BLD AUTO: 0.13 K/UL — SIGNIFICANT CHANGE UP (ref 0–0.5)
EOSINOPHIL NFR BLD AUTO: 1.3 % — SIGNIFICANT CHANGE UP (ref 0–6)
GLUCOSE SERPL-MCNC: 98 MG/DL — SIGNIFICANT CHANGE UP (ref 70–99)
HCT VFR BLD CALC: 44.6 % — SIGNIFICANT CHANGE UP (ref 39–50)
HGB BLD-MCNC: 15.2 G/DL — SIGNIFICANT CHANGE UP (ref 13–17)
IMM GRANULOCYTES NFR BLD AUTO: 0.3 % — SIGNIFICANT CHANGE UP (ref 0–1.5)
LIDOCAIN IGE QN: 136 U/L — SIGNIFICANT CHANGE UP (ref 73–393)
LYMPHOCYTES # BLD AUTO: 0.77 K/UL — LOW (ref 1–3.3)
LYMPHOCYTES # BLD AUTO: 7.8 % — LOW (ref 13–44)
MCHC RBC-ENTMCNC: 28.3 PG — SIGNIFICANT CHANGE UP (ref 27–34)
MCHC RBC-ENTMCNC: 34.1 GM/DL — SIGNIFICANT CHANGE UP (ref 32–36)
MCV RBC AUTO: 82.9 FL — SIGNIFICANT CHANGE UP (ref 80–100)
MONOCYTES # BLD AUTO: 0.8 K/UL — SIGNIFICANT CHANGE UP (ref 0–0.9)
MONOCYTES NFR BLD AUTO: 8.1 % — SIGNIFICANT CHANGE UP (ref 2–14)
NEUTROPHILS # BLD AUTO: 8.14 K/UL — HIGH (ref 1.8–7.4)
NEUTROPHILS NFR BLD AUTO: 82.2 % — HIGH (ref 43–77)
PLATELET # BLD AUTO: 182 K/UL — SIGNIFICANT CHANGE UP (ref 150–400)
POTASSIUM SERPL-MCNC: 3.4 MMOL/L — LOW (ref 3.5–5.3)
POTASSIUM SERPL-SCNC: 3.4 MMOL/L — LOW (ref 3.5–5.3)
PROT SERPL-MCNC: 7.1 GM/DL — SIGNIFICANT CHANGE UP (ref 6–8.3)
RAPID RVP RESULT: DETECTED
RBC # BLD: 5.38 M/UL — SIGNIFICANT CHANGE UP (ref 4.2–5.8)
RBC # FLD: 12.9 % — SIGNIFICANT CHANGE UP (ref 10.3–14.5)
RV+EV RNA SPEC QL NAA+PROBE: DETECTED
SARS-COV-2 RNA SPEC QL NAA+PROBE: SIGNIFICANT CHANGE UP
SODIUM SERPL-SCNC: 138 MMOL/L — SIGNIFICANT CHANGE UP (ref 135–145)
TROPONIN I, HIGH SENSITIVITY RESULT: 4.3 NG/L — SIGNIFICANT CHANGE UP
WBC # BLD: 9.9 K/UL — SIGNIFICANT CHANGE UP (ref 3.8–10.5)
WBC # FLD AUTO: 9.9 K/UL — SIGNIFICANT CHANGE UP (ref 3.8–10.5)

## 2022-05-17 PROCEDURE — 84484 ASSAY OF TROPONIN QUANT: CPT

## 2022-05-17 PROCEDURE — 80053 COMPREHEN METABOLIC PANEL: CPT

## 2022-05-17 PROCEDURE — 0225U NFCT DS DNA&RNA 21 SARSCOV2: CPT

## 2022-05-17 PROCEDURE — 99285 EMERGENCY DEPT VISIT HI MDM: CPT

## 2022-05-17 PROCEDURE — 85025 COMPLETE CBC W/AUTO DIFF WBC: CPT

## 2022-05-17 PROCEDURE — 36415 COLL VENOUS BLD VENIPUNCTURE: CPT

## 2022-05-17 PROCEDURE — 93005 ELECTROCARDIOGRAM TRACING: CPT

## 2022-05-17 PROCEDURE — 83690 ASSAY OF LIPASE: CPT

## 2022-05-17 PROCEDURE — 96374 THER/PROPH/DIAG INJ IV PUSH: CPT

## 2022-05-17 PROCEDURE — 71045 X-RAY EXAM CHEST 1 VIEW: CPT

## 2022-05-17 PROCEDURE — 93010 ELECTROCARDIOGRAM REPORT: CPT

## 2022-05-17 PROCEDURE — 96375 TX/PRO/DX INJ NEW DRUG ADDON: CPT

## 2022-05-17 PROCEDURE — 71045 X-RAY EXAM CHEST 1 VIEW: CPT | Mod: 26

## 2022-05-17 PROCEDURE — 99285 EMERGENCY DEPT VISIT HI MDM: CPT | Mod: 25

## 2022-05-17 RX ORDER — SODIUM CHLORIDE 9 MG/ML
2000 INJECTION INTRAMUSCULAR; INTRAVENOUS; SUBCUTANEOUS ONCE
Refills: 0 | Status: COMPLETED | OUTPATIENT
Start: 2022-05-17 | End: 2022-05-17

## 2022-05-17 RX ORDER — KETOROLAC TROMETHAMINE 30 MG/ML
15 SYRINGE (ML) INJECTION ONCE
Refills: 0 | Status: DISCONTINUED | OUTPATIENT
Start: 2022-05-17 | End: 2022-05-17

## 2022-05-17 RX ORDER — FAMOTIDINE 10 MG/ML
20 INJECTION INTRAVENOUS ONCE
Refills: 0 | Status: COMPLETED | OUTPATIENT
Start: 2022-05-17 | End: 2022-05-17

## 2022-05-17 RX ORDER — ONDANSETRON 8 MG/1
4 TABLET, FILM COATED ORAL ONCE
Refills: 0 | Status: COMPLETED | OUTPATIENT
Start: 2022-05-17 | End: 2022-05-17

## 2022-05-17 RX ADMIN — Medication 30 MILLILITER(S): at 08:29

## 2022-05-17 RX ADMIN — Medication 15 MILLIGRAM(S): at 08:29

## 2022-05-17 RX ADMIN — SODIUM CHLORIDE 4000 MILLILITER(S): 9 INJECTION INTRAMUSCULAR; INTRAVENOUS; SUBCUTANEOUS at 08:30

## 2022-05-17 RX ADMIN — FAMOTIDINE 20 MILLIGRAM(S): 10 INJECTION INTRAVENOUS at 08:29

## 2022-05-17 RX ADMIN — ONDANSETRON 4 MILLIGRAM(S): 8 TABLET, FILM COATED ORAL at 08:35

## 2022-05-17 NOTE — ED ADULT NURSE NOTE - NSIMPLEMENTINTERV_GEN_ALL_ED
Implemented All Universal Safety Interventions:  [place] to call system. Call bell, personal items and telephone within reach. Instruct patient to call for assistance. Room bathroom lighting operational. Non-slip footwear when patient is off stretcher. Physically safe environment: no spills, clutter or unnecessary equipment. Stretcher in lowest position, wheels locked, appropriate side rails in place.

## 2022-05-17 NOTE — ED PROVIDER NOTE - OBJECTIVE STATEMENT
35 y/o male with a PMHx of asthma presents to the ED for evaluation. Pt reports he has had a head cold x2 days. Pt then developed vomiting, watery diarrhea, abd cramping, SOB and nonproductive cough. Pt was seen at urgent care, diagnosed with PNA and was started on abx. Pt tested for COVID, resulting negative. No fevers today. No other complaints at this time.

## 2022-05-17 NOTE — ED ADULT NURSE NOTE - OBJECTIVE STATEMENT
Pt reports feeling"under the weather" since saturday, Pt states that has tested negative forCOVID at home but pt has has non productive cough with fever at home. Pt is Pt is alert and oriented x4.VSS remained wdl throughout shift. pt is tolerating ordered diet.no gi distress noted. pt is oob and ambulating in hallway with minimal assistance. incentive spirometer has been encouraged 10x each hour. pt denied chest pain and shortness of breath. no apparent distress noted. pt otherwise stable. will continue to monitor.

## 2022-05-17 NOTE — ED PROVIDER NOTE - PATIENT PORTAL LINK FT
You can access the FollowMyHealth Patient Portal offered by [facility] by registering at the following website: [email or web address]. By joining [facility]’s FollowMyHealth portal, you will also be able to view your health information using other applications (apps) compatible with our system.

## 2022-05-17 NOTE — ED ADULT TRIAGE NOTE - CHIEF COMPLAINT QUOTE
Pt dx with walking pnu yesterday at urgent care. Per pt, negative covid test at  with no flu test.  Pt took zithro, albuterol, and delsom this am. Reports feeling increased SOB and fatigue.

## 2022-06-16 ENCOUNTER — NON-APPOINTMENT (OUTPATIENT)
Age: 37
End: 2022-06-16

## 2022-07-14 ENCOUNTER — NON-APPOINTMENT (OUTPATIENT)
Age: 37
End: 2022-07-14

## 2022-07-16 ENCOUNTER — NON-APPOINTMENT (OUTPATIENT)
Age: 37
End: 2022-07-16

## 2022-07-19 ENCOUNTER — APPOINTMENT (OUTPATIENT)
Dept: RADIOLOGY | Facility: CLINIC | Age: 37
End: 2022-07-19

## 2022-07-19 ENCOUNTER — OUTPATIENT (OUTPATIENT)
Dept: OUTPATIENT SERVICES | Facility: HOSPITAL | Age: 37
LOS: 1 days | End: 2022-07-19
Payer: MEDICAID

## 2022-07-19 DIAGNOSIS — R05.1 ACUTE COUGH: ICD-10-CM

## 2022-07-19 DIAGNOSIS — Z00.8 ENCOUNTER FOR OTHER GENERAL EXAMINATION: ICD-10-CM

## 2022-07-19 PROCEDURE — 71046 X-RAY EXAM CHEST 2 VIEWS: CPT | Mod: 26

## 2022-07-19 PROCEDURE — 71046 X-RAY EXAM CHEST 2 VIEWS: CPT

## 2022-08-19 ENCOUNTER — EMERGENCY (EMERGENCY)
Facility: HOSPITAL | Age: 37
LOS: 0 days | Discharge: HOME | End: 2022-08-19
Attending: EMERGENCY MEDICINE
Payer: MEDICAID

## 2022-08-19 VITALS — HEIGHT: 70 IN | WEIGHT: 229.94 LBS

## 2022-08-19 VITALS
DIASTOLIC BLOOD PRESSURE: 87 MMHG | OXYGEN SATURATION: 100 % | TEMPERATURE: 98 F | HEART RATE: 90 BPM | SYSTOLIC BLOOD PRESSURE: 126 MMHG | RESPIRATION RATE: 20 BRPM

## 2022-08-19 DIAGNOSIS — U07.1 COVID-19: ICD-10-CM

## 2022-08-19 DIAGNOSIS — R53.1 WEAKNESS: ICD-10-CM

## 2022-08-19 DIAGNOSIS — M79.10 MYALGIA, UNSPECIFIED SITE: ICD-10-CM

## 2022-08-19 DIAGNOSIS — Z88.8 ALLERGY STATUS TO OTHER DRUGS, MEDICAMENTS AND BIOLOGICAL SUBSTANCES: ICD-10-CM

## 2022-08-19 DIAGNOSIS — R51.9 HEADACHE, UNSPECIFIED: ICD-10-CM

## 2022-08-19 DIAGNOSIS — J45.909 UNSPECIFIED ASTHMA, UNCOMPLICATED: ICD-10-CM

## 2022-08-19 DIAGNOSIS — E86.0 DEHYDRATION: ICD-10-CM

## 2022-08-19 DIAGNOSIS — E87.6 HYPOKALEMIA: ICD-10-CM

## 2022-08-19 LAB
ALBUMIN SERPL ELPH-MCNC: 4.1 G/DL — SIGNIFICANT CHANGE UP (ref 3.3–5)
ALP SERPL-CCNC: 56 U/L — SIGNIFICANT CHANGE UP (ref 40–120)
ALT FLD-CCNC: 27 U/L — SIGNIFICANT CHANGE UP (ref 12–78)
ANION GAP SERPL CALC-SCNC: 6 MMOL/L — SIGNIFICANT CHANGE UP (ref 5–17)
AST SERPL-CCNC: 19 U/L — SIGNIFICANT CHANGE UP (ref 15–37)
BASOPHILS # BLD AUTO: 0.02 K/UL — SIGNIFICANT CHANGE UP (ref 0–0.2)
BASOPHILS NFR BLD AUTO: 0.4 % — SIGNIFICANT CHANGE UP (ref 0–2)
BILIRUB SERPL-MCNC: 0.4 MG/DL — SIGNIFICANT CHANGE UP (ref 0.2–1.2)
BUN SERPL-MCNC: 12 MG/DL — SIGNIFICANT CHANGE UP (ref 7–23)
CALCIUM SERPL-MCNC: 8.8 MG/DL — SIGNIFICANT CHANGE UP (ref 8.5–10.1)
CHLORIDE SERPL-SCNC: 106 MMOL/L — SIGNIFICANT CHANGE UP (ref 96–108)
CO2 SERPL-SCNC: 26 MMOL/L — SIGNIFICANT CHANGE UP (ref 22–31)
CREAT SERPL-MCNC: 0.96 MG/DL — SIGNIFICANT CHANGE UP (ref 0.5–1.3)
EGFR: 104 ML/MIN/1.73M2 — SIGNIFICANT CHANGE UP
EOSINOPHIL # BLD AUTO: 0.09 K/UL — SIGNIFICANT CHANGE UP (ref 0–0.5)
EOSINOPHIL NFR BLD AUTO: 1.8 % — SIGNIFICANT CHANGE UP (ref 0–6)
GLUCOSE SERPL-MCNC: 76 MG/DL — SIGNIFICANT CHANGE UP (ref 70–99)
HCT VFR BLD CALC: 42.2 % — SIGNIFICANT CHANGE UP (ref 39–50)
HGB BLD-MCNC: 14.1 G/DL — SIGNIFICANT CHANGE UP (ref 13–17)
IMM GRANULOCYTES NFR BLD AUTO: 0.4 % — SIGNIFICANT CHANGE UP (ref 0–1.5)
LYMPHOCYTES # BLD AUTO: 0.23 K/UL — LOW (ref 1–3.3)
LYMPHOCYTES # BLD AUTO: 4.7 % — LOW (ref 13–44)
MCHC RBC-ENTMCNC: 28.2 PG — SIGNIFICANT CHANGE UP (ref 27–34)
MCHC RBC-ENTMCNC: 33.4 GM/DL — SIGNIFICANT CHANGE UP (ref 32–36)
MCV RBC AUTO: 84.4 FL — SIGNIFICANT CHANGE UP (ref 80–100)
MONOCYTES # BLD AUTO: 0.49 K/UL — SIGNIFICANT CHANGE UP (ref 0–0.9)
MONOCYTES NFR BLD AUTO: 10 % — SIGNIFICANT CHANGE UP (ref 2–14)
NEUTROPHILS # BLD AUTO: 4.06 K/UL — SIGNIFICANT CHANGE UP (ref 1.8–7.4)
NEUTROPHILS NFR BLD AUTO: 82.7 % — HIGH (ref 43–77)
PLATELET # BLD AUTO: 141 K/UL — LOW (ref 150–400)
POTASSIUM SERPL-MCNC: 3.2 MMOL/L — LOW (ref 3.5–5.3)
POTASSIUM SERPL-SCNC: 3.2 MMOL/L — LOW (ref 3.5–5.3)
PROT SERPL-MCNC: 6.6 GM/DL — SIGNIFICANT CHANGE UP (ref 6–8.3)
RBC # BLD: 5 M/UL — SIGNIFICANT CHANGE UP (ref 4.2–5.8)
RBC # FLD: 13.1 % — SIGNIFICANT CHANGE UP (ref 10.3–14.5)
SODIUM SERPL-SCNC: 138 MMOL/L — SIGNIFICANT CHANGE UP (ref 135–145)
WBC # BLD: 4.91 K/UL — SIGNIFICANT CHANGE UP (ref 3.8–10.5)
WBC # FLD AUTO: 4.91 K/UL — SIGNIFICANT CHANGE UP (ref 3.8–10.5)

## 2022-08-19 PROCEDURE — 96375 TX/PRO/DX INJ NEW DRUG ADDON: CPT

## 2022-08-19 PROCEDURE — 80053 COMPREHEN METABOLIC PANEL: CPT

## 2022-08-19 PROCEDURE — 85025 COMPLETE CBC W/AUTO DIFF WBC: CPT

## 2022-08-19 PROCEDURE — 96374 THER/PROPH/DIAG INJ IV PUSH: CPT

## 2022-08-19 PROCEDURE — 99284 EMERGENCY DEPT VISIT MOD MDM: CPT

## 2022-08-19 PROCEDURE — 36415 COLL VENOUS BLD VENIPUNCTURE: CPT

## 2022-08-19 PROCEDURE — 93005 ELECTROCARDIOGRAM TRACING: CPT

## 2022-08-19 PROCEDURE — 99284 EMERGENCY DEPT VISIT MOD MDM: CPT | Mod: 25

## 2022-08-19 PROCEDURE — 93010 ELECTROCARDIOGRAM REPORT: CPT

## 2022-08-19 RX ORDER — SODIUM CHLORIDE 9 MG/ML
1000 INJECTION INTRAMUSCULAR; INTRAVENOUS; SUBCUTANEOUS ONCE
Refills: 0 | Status: COMPLETED | OUTPATIENT
Start: 2022-08-19 | End: 2022-08-19

## 2022-08-19 RX ORDER — DIPHENHYDRAMINE HCL 50 MG
25 CAPSULE ORAL ONCE
Refills: 0 | Status: COMPLETED | OUTPATIENT
Start: 2022-08-19 | End: 2022-08-19

## 2022-08-19 RX ORDER — METOCLOPRAMIDE HCL 10 MG
10 TABLET ORAL ONCE
Refills: 0 | Status: COMPLETED | OUTPATIENT
Start: 2022-08-19 | End: 2022-08-19

## 2022-08-19 RX ORDER — POTASSIUM CHLORIDE 20 MEQ
40 PACKET (EA) ORAL ONCE
Refills: 0 | Status: COMPLETED | OUTPATIENT
Start: 2022-08-19 | End: 2022-08-19

## 2022-08-19 RX ORDER — NIRMATRELVIR AND RITONAVIR 150-100 MG
3 KIT ORAL
Qty: 30 | Refills: 0
Start: 2022-08-19 | End: 2022-08-23

## 2022-08-19 RX ADMIN — Medication 10 MILLIGRAM(S): at 16:36

## 2022-08-19 RX ADMIN — SODIUM CHLORIDE 1000 MILLILITER(S): 9 INJECTION INTRAMUSCULAR; INTRAVENOUS; SUBCUTANEOUS at 16:36

## 2022-08-19 RX ADMIN — Medication 40 MILLIEQUIVALENT(S): at 19:05

## 2022-08-19 RX ADMIN — Medication 25 MILLIGRAM(S): at 16:36

## 2022-08-19 NOTE — ED STATDOCS - ATTENDING APP SHARED VISIT CONTRIBUTION OF CARE
I, Saranya Alfaro MD, performed the initial face to face bedside interview with this patient regarding history of present illness, review of symptoms and relevant past medical, social and family history.  I completed an independent physical examination.  I was the initial provider who evaluated this patient. I have signed out the follow up of any pending tests (i.e. labs, radiological studies) to the ACP.  I have communicated the patient’s plan of care and disposition with the ACP.  The history, relevant review of systems, past medical and surgical history, medical decision making, and physical examination was documented by the scribe in my presence and I attest to the accuracy of the documentation.

## 2022-08-19 NOTE — ED STATDOCS - OBJECTIVE STATEMENT
38 y/o male with a PMhx of asthma presents to the ED c/o  HA and body aches, weakness. States he has been dx with covid for a few days, has been taking Tylenol without much effect. No cough, sore throat. No sick contacts. COVID vax.

## 2022-08-19 NOTE — ED ADULT TRIAGE NOTE - CHIEF COMPLAINT QUOTE
patient ambulatory to ED covid + c/o chest pain, SOB, severe headache.  patient tested + this morning.

## 2022-08-19 NOTE — ED STATDOCS - PATIENT PORTAL LINK FT
You can access the FollowMyHealth Patient Portal offered by [facility] by registering at the following website: [email or web address]. By joining [facility]’s FollowMyHealth portal, you will also be able to view your health information using other applications (apps) compatible with our system.

## 2022-08-19 NOTE — ED STATDOCS - CLINICAL SUMMARY MEDICAL DECISION MAKING FREE TEXT BOX
37 M with COVID, will give IV fluids, medicate for HA, pt feels dehydrated  will give IV fluids and d/c with Paxlovid.

## 2022-08-19 NOTE — ED STATDOCS - NS ED ROS FT
Constitutional: No fever or chills  Eyes: No visual changes  HEENT: No throat pain  CV: No chest pain  Resp: No SOB no cough  GI: No abd pain, nausea or vomiting  : No dysuria  MSK: +body aches +weakness  Skin: No rash  Neuro: + headache

## 2022-08-19 NOTE — ED ADULT NURSE NOTE - OBJECTIVE STATEMENT
Pt reports symptoms x 3 days with covid + today.  Pt reports severe body ache and headache.  Labs and meds as ordered.

## 2022-08-19 NOTE — ED STATDOCS - PROGRESS NOTE DETAILS
38 y/o M with PMH of asthma presents w/ HA, body aches. Home tested positive for covid. Has been taking tylenol at home for symptoms. PE: Well appearing. Cardiac: s1s2, RRR. Lungs: CTAB. Abdomen: NBS x4, soft, nontender. PV: No LE edema, calf tenderness. A/P: Covid-19, will check basic labs, hydrate, expected dc home. - Wil Lima PA-C K+ 3.2 Labs otherwise not actionable. Pt feels well. Will provide PO K+ and dc home. - Wil Lima PA-C

## 2022-08-19 NOTE — ED STATDOCS - CARE PLAN
1 Principal Discharge DX:	2019 novel coronavirus disease (COVID-19)  Secondary Diagnosis:	Hypokalemia

## 2022-08-19 NOTE — ED STATDOCS - NSFOLLOWUPINSTRUCTIONS_ED_ALL_ED_FT
Hypokalemia    WHAT YOU NEED TO KNOW:    Hypokalemia is a low level of potassium in your blood. Potassium helps control how your muscles, heart, and digestive system work. Hypokalemia occurs when your body loses too much potassium or does not absorb enough from food.     DISCHARGE INSTRUCTIONS:    Return to the emergency department if:     You cannot move your arm or leg.      You have a fast or irregular heartbeat.      You are too tired or weak to stand up.    Contact your healthcare provider if:     You are vomiting, or you have diarrhea.      You have numbness or tingling in your arms or legs.      Your symptoms do not go away or they get worse.      You have questions or concerns about your condition or care.    Medicines:     Potassium will be given to bring your potassium levels back to normal.      Take your medicine as directed. Contact your healthcare provider if you think your medicine is not helping or if you have side effects. Tell him of her if you are allergic to any medicine. Keep a list of the medicines, vitamins, and herbs you take. Include the amounts, and when and why you take them. Bring the list or the pill bottles to follow-up visits. Carry your medicine list with you in case of an emergency.    Eat foods that are high in potassium: Foods that are high in potassium include bananas, oranges, tomatoes, potatoes, and avocado. [place] beans, turkey, salmon, lean beef, yogurt, and milk are also high in potassium. Ask your healthcare provider or dietitian for more information about foods that are high in potassium.     Follow up with your healthcare provider as directed: Write down your questions so you remember to ask them during your visits.      COVID-19: Quarantine and Isolation      Quarantine    If you were exposed     Quarantine and stay away from others when you have been in close contact with someone who has COVID-19.      Isolate    If you are sick or test positive     Isolate when you are sick or when you have COVID-19, even if you don't have symptoms.      When to stay home    Calculating quarantine     The date of your exposure is considered day 0. Day 1 is the first full day after your last contact with a person who has had COVID-19. Stay home and away from other people for at least 5 days. Learn why CDC updated guidance for the general public.    IF YOU were exposed to COVID-19 and are NOT    up to date   IF YOU were exposed to COVID-19 and are NOT on COVID-19 vaccinations   • Quarantine for at least 5 days • Stay home   •Stay home and quarantine for at least 5 full days.      •Wear a well-fitting mask if you must be around others in your home.        • Do not travel.     • Get tested   •Even if you don't develop symptoms, get tested at least 5 days after you last had close contact with someone with COVID-19.        • After quarantine • Watch for symptoms   •Watch for symptoms until 10 days after you last had close contact with someone with COVID-19.      • Avoid travel   •It is best to avoid travel until a full 10 days after you last had close contact with someone with COVID-19.      • If you develop symptoms   • Isolate immediately and get tested. Continue to stay home until you know the results. Wear a well-fitting mask around others.        • Take precautions until day 10 • Wear a well-fitting mask   •Wear a well-fitting mask for 10 full days any time you are around others inside your home or in public. Do not go to places where you are unable to wear a well-fitting mask.        • If you must travel during days 6–10, take precautions.       • Avoid being around people who are more likely to get very sick from COVID-19.         IF YOU were exposed to COVID-19 and are    up to date   IF YOU were exposed to COVID-19 and are on COVID-19 vaccinations   • No quarantine   •You do not need to stay home unless you develop symptoms.      • Get tested   •Even if you don't develop symptoms, get tested at least 5 days after you last had close contact with someone with COVID-19.      • Watch for symptoms   •Watch for symptoms until 10 days after you last had close contact with someone with COVID-19.    • If you develop symptoms   • Isolate immediately and get tested. Continue to stay home until you know the results. Wear a well-fitting mask around others.        • Take precautions until day 10 • Wear a well-fitting mask   •Wear a well-fitting mask for 10 full days any time you are around others inside your home or in public. Do not go to places where you are unable to wear a well-fitting mask.        • Take precautions if traveling        • Avoid being around people who are more likely to get very sick from COVID-19.         IF YOU were exposed to COVID-19 and had confirmed COVID-19 within the past 90 days (you tested positive using a viral test)   • No quarantine   •You do not need to stay home unless you develop symptoms.      • Watch for symptoms   •Watch for symptoms until 10 days after you last had close contact with someone with COVID-19.    • If you develop symptoms   • Isolate immediately and get tested. Continue to stay home until you know the results. Wear a well-fitting mask around others.        • Take precautions until day 10 • Wear a well-fitting mask   •Wear a well-fitting mask for 10 full days any time you are around others inside your home or in public. Do not go to places where you are unable to wear a well-fitting mask.        • Take precautions if traveling        • Avoid being around people who are more likely to get very sick from COVID-19.         Calculating isolation     Day 0 is your first day of symptoms or a positive viral test. Day 1 is the first full day after your symptoms developed or your test specimen was collected. If you have COVID-19 or have symptoms, isolate for at least 5 days.    IF YOU tested positive for COVID-19 or have symptoms, regardless of vaccination status   • Stay home for at least 5 days   •Stay home for 5 days and isolate from others in your home.      •Wear a well-fitting mask if you must be around others in your home.      • Do not travel.       • Ending isolation if you had symptoms   • End isolation after 5 full days if you are fever-free for 24 hours (without the use of fever-reducing medication) and your symptoms are improving.      • Ending isolation if you did NOT have symptoms   • End isolation after at least 5 full days after your positive test.      • If you got very sick from COVID-19 or have a weakened immune system   •You should isolate for at least 10 days. Consult your doctor before ending isolation.      • Take precautions until day 10 • Wear a well-fitting mask   •Wear a well-fitting mask for 10 full days any time you are around others inside your home or in public. Do not go to places where you are unable to wear a well-fitting mask.      • Do not travel   • Do not travel until a full 10 days after your symptoms started or the date your positive test was taken if you had no symptoms.        • Avoid being around people who are more likely to get very sick from COVID-19.           Definitions    Exposure     Contact with someone infected with SARS-CoV-2, the virus that causes COVID-19, in a way that increases the likelihood of getting infected with the virus.    Close contact     A close contact is someone who was less than 6 feet away from an infected person (laboratory-confirmed or a clinical diagnosis) for a cumulative total of 15 minutes or more over a 24-hour period. For example, three individual 5-minute exposures for a total of 15 minutes. People who are exposed to someone with COVID-19 after they completed at least 5 days of isolation are not considered close contacts.      Quarantine    Quarantine is a strategy used to prevent transmission of COVID-19 by keeping people who have been in close contact with someone with COVID-19 apart from others.    Who does not need to quarantine?     If you had close contact with someone with COVID-19 and you are in one of the following groups, you do not need to quarantine.  •You are up to date with your COVID-19 vaccines.      •You had confirmed COVID-19 within the last 90 days (meaning you tested positive using a viral test).      If you are up to date with COVID-19 vaccines, you should wear a well-fitting mask around others for 10 days from the date of your last close contact with someone with COVID-19 (the date of last close contact is considered day 0). Get tested at least 5 days after you last had close contact with someone with COVID-19. If you test positive or develop COVID-19 symptoms, isolate from other people and follow recommendations in the Isolation section below. If you tested positive for COVID-19 with a viral test within the previous 90 days and subsequently recovered and remain without COVID-19 symptoms, you do not need to quarantine or get tested after close contact. You should wear a well-fitting mask around others for 10 days from the date of your last close contact with someone with COVID-19 (the date of last close contact is considered day 0). If you have COVID-19 symptoms, get tested and isolate from other people and follow recommendations in the Isolation section below.    Who should quarantine?    If you come into close contact with someone with COVID-19, you should quarantine if you are not up to date on COVID-19 vaccines. This includes people who are not vaccinated.    What to do for quarantine    •Stay home and away from other people for at least 5 days (day 0 through day 5) after your last contact with a person who has COVID-19. The date of your exposure is considered day 0. Wear a well-fitting mask when around others at home, if possible.      •For 10 days after your last close contact with someone with COVID-19, watch for fever (100.4°F or greater), cough, shortness of breath, or other COVID-19 symptoms.      •If you develop symptoms, get tested immediately and isolate until you receive your test results. If you test positive, follow isolation recommendations.    •If you do not develop symptoms, get tested at least 5 days after you last had close contact with someone with COVID-19.  •If you test negative, you can leave your home, but continue to wear a well-fitting mask when around others at home and in public until 10 days after your last close contact with someone with COVID-19.      •If you test positive, you should isolate for at least 5 days from the date of your positive test (if you do not have symptoms). If you do develop COVID-19 symptoms, isolate for at least 5 days from the date your symptoms began (the date the symptoms started is day 0). Follow recommendations in the isolation section below.      •If you are unable to get a test 5 days after last close contact with someone with COVID-19, you can leave your home after day 5 if you have been without COVID-19 symptoms throughout the 5-day period. Wear a well-fitting mask for 10 days after your date of last close contact when around others at home and in public.      •Avoid people who are have weakened immune systems or are more likely to get very sick from COVID-19, and nursing homes and other high-risk settings, until after at least 10 days.        •If possible, stay away from people you live with, especially people who are at higher risk for getting very sick from COVID-19, as well as others outside your home throughout the full 10 days after your last close contact with someone with COVID-19.      •If you are unable to quarantine, you should wear a well-fitting mask for 10 days when around others at home and in public.      •If you are unable to wear a mask when around others, you should continue to quarantine for 10 days. Avoid people who have weakened immune systems or are more likely to get very sick from COVID-19, and nursing homes and other high-risk settings, until after at least 10 days.      •See additional information about travel.      •Do not go to places where you are unable to wear a mask, such as restaurants and some gyms, and avoid eating around others at home and at work until after 10 days after your last close contact with someone with COVID-19.      After quarantine    •Watch for symptoms until 10 days after your last close contact with someone with COVID-19.      •If you have symptoms, isolate immediately and get tested.      Quarantine in high-risk congregate settings    In certain congregate settings that have high risk of secondary transmission (such as correctional and [facility] facilities, homeless shelters, or cruise ships), CDC recommends a 10-day quarantine for residents, regardless of vaccination and booster status. During periods of critical staffing shortages, facilities may consider shortening the quarantine period for staff to ensure continuity of operations. Decisions to shorten quarantine in these settings should be made in consultation with state, local, [place], or territorial health departments and should take into consideration the context and characteristics of the facility. CDC's setting-specific guidance provides additional recommendations for these settings.      Isolation    Isolation is used to separate people with confirmed or suspected COVID-19 from those without COVID-19. People who are in isolation should stay home until it's safe for them to be around others. At home, anyone sick or infected should separate from others, or wear a well-fitting mask when they need to be around others. People in isolation should stay in a specific "sick room" or area and use a separate bathroom if available. Everyone who has presumed or confirmed COVID-19 should stay home and isolate from other people for at least 5 full days (day 0 is the first day of symptoms or the date of the day of the positive viral test for asymptomatic persons). They should wear a mask when around others at home and in public for an additional 5 days. People who are confirmed to have COVID-19 or are showing symptoms of COVID-19 need to isolate regardless of their vaccination status. This includes:  •People who have a positive viral test for COVID-19, regardless of whether or not they have symptoms.      •People with symptoms of COVID-19, including people who are awaiting test results or have not been tested. People with symptoms should isolate even if they do not know if they have been in close contact with someone with COVID-19.      What to do for isolation    •Monitor your symptoms. If you have an emergency warning sign (including trouble breathing), seek emergency medical care immediately.      •Stay in a separate room from other household members, if possible.      •Use a separate bathroom, if possible.      •Take steps to improve ventilation at home, if possible.      •Avoid contact with other members of the household and pets.      •Don't share personal household items, like cups, towels, and utensils.      •Wear a well-fitting mask when you need to be around other people.      Learn more about what to do if you are sick and how to notify your contacts.    Ending isolation for people who had COVID-19 and had symptoms    If you had COVID-19 and had symptoms, isolate for at least 5 days. To calculate your 5-day isolation period, day 0 is your first day of symptoms. Day 1 is the first full day after your symptoms developed. You can leave isolation after 5 full days.  •You can end isolation after 5 full days if you are fever-free for 24 hours without the use of fever-reducing medication and your other symptoms have improved (Loss of taste and smell may persist for weeks or months after recovery and need not delay the end of isolation).      •You should continue to wear a well-fitting mask around others at home and in public for 5 additional days (day 6 through day 10) after the end of your 5-day isolation period. If you are unable to wear a mask when around others, you should continue to isolate for a full 10 days. Avoid people who have weakened immune systems or are more likely to get very sick from COVID-19, and nursing homes and other high-risk settings, until after at least 10 days.      •If you continue to have fever or your other symptoms have not improved after 5 days of isolation, you should wait to end your isolation until you are fever-free for 24 hours without the use of fever-reducing medication and your other symptoms have improved. Continue to wear a well-fitting mask through day 10. Contact your healthcare provider if you have questions.      •See additional information about travel.      •Do not go to places where you are unable to wear a mask, such as restaurants and some gyms, and avoid eating around others at home and at work until a full 10 days after your first day of symptoms.      If an individual has access to a test and wants to test, the best approach is to use an antigen test1 towards the end of the 5-day isolation period. Collect the test sample only if you are fever-free for 24 hours without the use of fever-reducing medication and your other symptoms have improved (loss of taste and smell may persist for weeks or months after recovery and need not delay the end of isolation). If your test result is positive, you should continue to isolate until day 10. If your test result is negative, you can end isolation, but continue to wear a well-fitting mask around others at home and in public until day 10. Follow additional recommendations for masking and avoiding travel as described above.    1As noted in the labeling for authorized over-the counter antigen tests: Negative results should be treated as presumptive. Negative results do not rule out SARS-CoV-2 infection and should not be used as the sole basis for treatment or patient management decisions, including infection control decisions. To improve results, antigen tests should be used twice over a three-day period with at least 24 hours and no more than 48 hours between tests.    Note that these recommendations on ending isolation do not apply to people who are moderately ill or very sick from COVID-19 or have weakened immune systems. See section below for recommendations for when to end isolation for these groups.    Ending isolation for people who tested positive for COVID-19 but had no symptoms    If you test positive for COVID-19 and never develop symptoms, isolate for at least 5 days. Day 0 is the day of your positive viral test (based on the date you were tested) and day 1 is the first full day after the specimen was collected for your positive test. You can leave isolation after 5 full days.  •If you continue to have no symptoms, you can end isolation after at least 5 days.      •You should continue to wear a well-fitting mask around others at home and in public until day 10 (day 6 through day 10). If you are unable to wear a mask when around others, you should continue to isolate for 10 days. Avoid people who have weakened immune systems or are more likely to get very sick from COVID-19, and nursing homes and other high-risk settings, until after at least 10 days.      •If you develop symptoms after testing positive, your 5-day isolation period should start over. Day 0 is your first day of symptoms. Follow the recommendations above for ending isolation for people who had COVID-19 and had symptoms.      •See additional information about travel.      •Do not go to places where you are unable to wear a mask, such as restaurants and some gyms, and avoid eating around others at home and at work until 10 days after the day of your positive test.      If an individual has access to a test and wants to test, the best approach is to use an antigen test1 towards the end of the 5-day isolation period. If your test result is positive, you should continue to isolate until day 10. If your test result is positive, you can also choose to test daily and if your test result is negative, you can end isolation, but continue to wear a well-fitting mask around others at home and in public until day 10. Follow additional recommendations for masking and avoiding travel as described above.    1As noted in the labeling for authorized over-the counter antigen tests: Negative results should be treated as presumptive. Negative results do not rule out SARS-CoV-2 infection and should not be used as the sole basis for treatment or patient management decisions, including infection control decisions. To improve results, antigen tests should be used twice over a three-day period with at least 24 hours and no more than 48 hours between tests.    Ending isolation for people who were moderately or very sick from COVID-19 or have a weakened immune system    People who are moderately ill from COVID-19 (experiencing symptoms that affect the lungs like shortness of breath or difficulty breathing) should isolate for 10 days and follow all other isolation precautions. To calculate your 10-day isolation period, day 0 is your first day of symptoms. Day 1 is the first full day after your symptoms developed. If you are unsure if your symptoms are moderate, talk to a healthcare provider for further guidance.    People who are very sick from COVID-19 (this means people who were hospitalized or required intensive care or ventilation support) and people who have weakened immune systems might need to isolate at home longer. They may also require testing with a viral test to determine when they can be around others. CDC recommends an isolation period of at least 10 and up to 20 days for people who were very sick from COVID-19 and for people with weakened immune systems. Consult with your healthcare provider about when you can resume being around other people. If you are unsure if your symptoms are severe or if you have a weakened immune system, talk to a healthcare provider for further guidance.    People who have a weakened immune system should talk to their healthcare provider about the potential for reduced immune responses to COVID-19 vaccines and the need to continue to follow current prevention measures (including wearing a well-fitting mask and avoiding crowds and poorly ventilated indoor spaces) to protect themselves against COVID-19 until advised otherwise by their healthcare provider. Close contacts of immunocompromised people—including household members—should also be encouraged to receive all recommended COVID-19 vaccine doses to help protect these people.    Isolation in high-risk congregate settings    In certain high-risk congregate settings that have high risk of secondary transmission and where it is not feasible to cohort people (such as correctional and [facility] facilities, homeless shelters, and cruise ships), CDC recommends a 10-day isolation period for residents. During periods of critical staffing shortages, facilities may consider shortening the isolation period for staff to ensure continuity of operations. Decisions to shorten isolation in these settings should be made in consultation with state, local, [place], or territorial health departments and should take into consideration the context and characteristics of the facility. CDC's setting-specific guidance provides additional recommendations for these settings.    This CDC guidance is meant to supplement—not replace—any federal, state, local, territorial, or [place] health and safety laws, rules, and regulations.      Recommendations for specific settings    These recommendations do not apply to healthcare professionals. For guidance specific to these settings, see  •Healthcare professionals: Interim Guidance for Managing Healthcare Personnel with SARS-CoV-2 Infection or Exposure to SARS-CoV-2      •Patients, residents, and visitors to healthcare settings: Interim Infection Prevention and Control Recommendations for Healthcare Personnel During the Coronavirus Disease 2019 (COVID-19) Pandemic      Additional setting-specific guidance and recommendations are available.  •These recommendations on quarantine and isolation do apply to [employer] settings. Additional guidance is available here: Overview of COVID-19 Quarantine for [employer]      •Travelers: Travel information and recommendations      •Congregate facilities and other settings: guidance pages for community, work, and school settings        Ongoing COVID-19 exposure FAQs    I live with someone with COVID-19, but I cannot be  from them. How do we manage quarantine in this situation?     It is very important for people with COVID-19 to remain apart from other people, if possible, even if they are living together. If separation of the person with COVID-19 from others that they live with is not possible, the other people that they live with will have ongoing exposure, meaning they will be repeatedly exposed until that person is no longer able to spread the virus to other people. In this situation, there are precautions you can take to limit the spread of COVID-19:  •The person with COVID-19 and everyone they live with should wear a well-fitting mask inside the home.      •If possible, one person should care for the person with COVID-19 to limit the number of people who are in close contact with the infected person.    •Take steps to protect yourself and others to reduce transmission in the home:  •Quarantine if you are not up to date with your COVID-19 vaccines.      •Isolate if you are sick or tested positive for COVID-19, even if you don't have symptoms.      •Learn more about the public health recommendations for testing, mask use and quarantine of close contacts, like yourself, who have ongoing exposure. These recommendations differ depending on your vaccination status.        What should I do if I have ongoing exposure to COVID-19 from someone I live with?    Recommendations for this situation depend on your vaccination status:    If you are not up to date on COVID-19 vaccines and have ongoing exposure to COVID-19, you should:  •Begin quarantine immediately and continue to quarantine throughout the isolation period of the person with COVID-19.      •Continue to quarantine for an additional 5 days starting the day after the end of isolation for the person with COVID-19.    •Get tested at least 5 days after the end of isolation of the infected person that lives with them.   •If you test negative, you can leave the home but should continue to wear a well-fitting mask when around others at home and in public until 10 days after the end of isolation for the person with COVID-19.         •Isolate immediately if you develop symptoms of COVID-19 or test positive.      If you are up to date with COVID-19 vaccines and have ongoing exposure to COVID-19, you should:  •Get tested at least 5 days after your first exposure. A person with COVID-19 is considered infectious starting 2 days before they develop symptoms, or 2 days before the date of their positive test if they do not have symptoms.      •Get tested again at least 5 days after the end of isolation for the person with COVID-19.      •Wear a well-fitting mask when you are around the person with COVID-19, and do this throughout their isolation period.      •Wear a well-fitting mask around others for 10 days after the infected person's isolation period ends.      Isolate immediately if you develop symptoms of COVID-19 or test positive.    What should I do if multiple people I live with test positive for COVID-19 at different times?    Recommendations for this situation depend on your vaccination status:•If you are not up to date with your COVID-19 vaccines, you should:  •Quarantine throughout the isolation period of any infected person that you live with.      •Continue to quarantine until 5 days after the end of isolation date for the most recently infected person that lives with you. For example, if the last day of isolation of the person most recently infected with COVID-19 was June 30, the new 5-day quarantine period starts on July 1.      •Get tested at least 5 days after the end of isolation for the most recently infected person that lives with you.      •Wear a well-fitting mask when you are around any person with COVID-19 while that person is in isolation.      •Wear a well-fitting mask when you are around other people until 10 days after your last close contact.      •Isolate immediately if you develop symptoms of COVID-19 or test positive.      •If you are up to date with your COVID-19 vaccines, you should:  •Get tested at least 5 days after your first exposure. A person with COVID-19 is considered infectious starting 2 days before they developed symptoms, or 2 days before the date of their positive test if they do not have symptoms.      •Get tested again at least 5 days after the end of isolation for the most recently infected person that lives with you.      •Wear a well-fitting mask when you are around any person with COVID-19 while that person is in isolation.      •Wear a well-fitting mask around others for 10 days after the end of isolation for the most recently infected person that lives with you. For example, if the last day of isolation for the person most recently infected with COVID-19 was June 30, the new 10-day period to wear a well-fitting mask indoors in public starts on July 1.      •Isolate immediately if you develop symptoms of COVID-19 or test positive.        I had COVID-19 and completed isolation. Do I have to quarantine or get tested if someone I live with gets COVID-19 shortly after I completed isolation?    No. If you recently completed isolation and someone that lives with you tests positive for the virus that causes COVID-19 shortly after the end of your isolation period, you do not have to quarantine or get tested as long as you do not develop new symptoms. Once all of the people that live together have completed isolation or quarantine, refer to the guidance below for new exposures to COVID-19.•If you had COVID-19 in the previous 90 days and then came into close contact with someone with COVID-19, you do not have to quarantine or get tested if you do not have symptoms. But you should:  •Wear a well-fitting mask indoors in public for 10 days after your last close contact.      •Monitor for COVID-19 symptoms for 10 days from the date of your last close contact.      •Isolate immediately and get tested if symptoms develop.        •If more than 90 days have passed since your recovery from infection, follow CDC's recommendations for close contacts. These recommendations will differ depending on your vaccination status.      03/30/2022    Content source: National Center for Immunization and Respiratory Diseases (NCIRD), Division of Viral Diseases    This information is not intended to replace advice given to you by your health care provider. Make sure you discuss any questions you have with your health care provider.

## 2022-09-26 ENCOUNTER — NON-APPOINTMENT (OUTPATIENT)
Age: 37
End: 2022-09-26

## 2022-12-06 NOTE — ED ADULT NURSE NOTE - NSHOSCREENINGQ1_ED_ALL_ED
[FreeTextEntry1] : 28 year old \No

## 2023-01-18 ENCOUNTER — NON-APPOINTMENT (OUTPATIENT)
Age: 38
End: 2023-01-18

## 2023-01-23 ENCOUNTER — NON-APPOINTMENT (OUTPATIENT)
Age: 38
End: 2023-01-23

## 2023-03-01 ENCOUNTER — APPOINTMENT (OUTPATIENT)
Dept: GASTROENTEROLOGY | Facility: CLINIC | Age: 38
End: 2023-03-01
Payer: MEDICAID

## 2023-03-01 VITALS
HEART RATE: 70 BPM | DIASTOLIC BLOOD PRESSURE: 90 MMHG | SYSTOLIC BLOOD PRESSURE: 131 MMHG | BODY MASS INDEX: 33.6 KG/M2 | HEIGHT: 71 IN | WEIGHT: 240 LBS

## 2023-03-01 DIAGNOSIS — R10.9 UNSPECIFIED ABDOMINAL PAIN: ICD-10-CM

## 2023-03-01 DIAGNOSIS — R11.10 VOMITING, UNSPECIFIED: ICD-10-CM

## 2023-03-01 DIAGNOSIS — R19.7 DIARRHEA, UNSPECIFIED: ICD-10-CM

## 2023-03-01 DIAGNOSIS — K21.9 GASTRO-ESOPHAGEAL REFLUX DISEASE W/OUT ESOPHAGITIS: ICD-10-CM

## 2023-03-01 DIAGNOSIS — R13.19 OTHER DYSPHAGIA: ICD-10-CM

## 2023-03-01 PROCEDURE — 99204 OFFICE O/P NEW MOD 45 MIN: CPT

## 2023-03-01 RX ORDER — SODIUM PICOSULFATE, MAGNESIUM OXIDE, AND ANHYDROUS CITRIC ACID 10; 3.5; 12 MG/160ML; G/160ML; G/160ML
10-3.5-12 MG-GM LIQUID ORAL
Qty: 1 | Refills: 0 | Status: ACTIVE | OUTPATIENT
Start: 2023-03-01

## 2023-03-01 NOTE — REVIEW OF SYSTEMS
[Negative] : Heme/Lymph [As Noted in HPI] : as noted in HPI [Heartburn] : heartburn [Diarrhea] : diarrhea [Abdominal Pain] : no abdominal pain [Vomiting] : no vomiting [Constipation] : no constipation [Melena (black stool)] : no melena [Bleeding] : no bleeding [Fecal Incontinence (soiling)] : no fecal incontinence [Bloating (gassiness)] : no bloating [FreeTextEntry7] : dysphagia

## 2023-03-01 NOTE — REASON FOR VISIT
[Initial Evaluation] : an initial evaluation [FreeTextEntry1] : fecal urgency, diarrhea with 5 BMs daily, Patient presents for a colonoscopy consult for dysphagia, GERD, and diarrhea after drinking alcohol.

## 2023-03-01 NOTE — PHYSICAL EXAM
[Hearing Threshold Finger Rub Not [place]] : hearing was normal [Normal Lips/Gums] : the lips and gums were normal [Normal] : oriented to person, place, and time

## 2023-03-01 NOTE — ASSESSMENT
[FreeTextEntry1] : ILSA FRITZ is a 37 year old male patient presenting to the office today for a colonoscopy consult. Reports history of cyclical stomach irritation/GERD (i.e. "IBS"); reports he is usually fine in the summer. Reports when drinks alcohol has abdominal pain and diarrhea, so patient does not drink any alcohol, except gluten-free vodka. Reports dysphagia when eats after physical exercise; EGD revealed esophageal spasms (?). Reports 5-6 bowel movements per day. Recommended patient take Pepcid, as needed for GERD, and schedule a colonoscopy and EGD. \par Daily marijuana use, plans to quit due to fertility/ upcoming IVF. no cigs, infrequent alcohol due to intolerance.  \par Will proceed with colonoscopy and EGD. I explained to the patient the risks, alternatives and benefits to a colonoscopy and EGD. Risk including but not limited to bleeding, perforation, infection adverse medication reaction. Questions were answered. agreeable to proceed with the planned procedures. \par \par The patient will hold NSAIDs for 5 days prior. Otherwise continue medications as prescribed. The patient is not on diabetes medications or anticoagulation. PST at [facility] including obtaining latest note from cardiologist including labs. \par \par Call with any questions or concerns. Reviewed and reconciled medications, allergies, PMHx, PSHx, SocHx, FMHx. Reviewed imaging, blood work, diagnostic testing, discussed with patient. Further recommendations pending results of above work up and evaluation.\par \par \par Time spent before and after visit reviewing patient's chart

## 2023-03-01 NOTE — HISTORY OF PRESENT ILLNESS
[FreeTextEntry1] : ILSA FRITZ is a 37 year old male patient presenting to the office today for a colonoscopy consult. Reports history of cyclical stomach irritation/heartburn (i.e. "IBS"); reports he is usually fine in the summer. Reports drinking "a lot" of  coffee and soda; reports when drinks alcohol has abdominal pain and diarrhea, so patient does not drink any alcohol, except gluten-free vodka. Reports eating a lot of bread. Reports dysphagia when eats after physical exercise, even with liquids; recent EGD revealed esophageal spasms. 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 Reports no pain when swallowing or blood in stool. Reports 5-6 bowel movements per day. \par Reports mother is gluten intolerant and possibly has IBS. Patient has family history of colon polyps (maternal grandfather and father). Reports not taking NSAIDs regularly. Reports taking Prilosec or Nexium, as needed. Reports not smoking cigarettes; reports smokes cannabis, daily. Reports plans to stop smoking cannabis due to starting IVF soon.  [de-identified] : wnl, except esophageal spasming

## 2023-03-01 NOTE — CONSULT LETTER
[Dear  ___] : Dear  [unfilled], [Consult Letter:] : I had the pleasure of evaluating your patient, [unfilled]. [Consult Closing:] : Thank you very much for allowing me to participate in the care of this patient.  If you have any questions, please do not hesitate to contact me. [Sincerely,] : Sincerely, [FreeTextEntry1] : ILSA FRITZ is a 37 year old male patient presenting to the office today for a colonoscopy consult. Reports history of cyclical stomach irritation/GERD (i.e. "IBS"); reports he is usually fine in the summer. Reports when drinks alcohol has abdominal pain and diarrhea, so patient does not drink any alcohol, except gluten-free vodka. Reports dysphagia when eats after physical exercise; EGD revealed esophageal spasms (?). Reports 5-6 bowel movements per day. Recommended patient take Pepcid, as needed for GERD, and schedule a colonoscopy and EGD. \par Daily marijuana use, plans to quit due to fertility/ upcoming IVF. no cigs, infrequent alcohol due to intolerance.  \par Will proceed with colonoscopy and EGD. I explained to the patient the risks, alternatives and benefits to a colonoscopy and EGD. Risk including but not limited to bleeding, perforation, infection adverse medication reaction. Questions were answered. agreeable to proceed with the planned procedures. \par \par The patient will hold NSAIDs for 5 days prior. Otherwise continue medications as prescribed. The patient is not on diabetes medications or anticoagulation. PST at [facility] including obtaining latest note from cardiologist including labs. \par \par Call with any questions or concerns. Reviewed and reconciled medications, allergies, PMHx, PSHx, SocHx, FMHx. Reviewed imaging, blood work, diagnostic testing, discussed with patient. Further recommendations pending results of above work up and evaluation. [FreeTextEntry3] : Chelita Moy MD\par Gastroenterology, Hepatology and Motility\par  Self

## 2023-03-13 ENCOUNTER — NON-APPOINTMENT (OUTPATIENT)
Age: 38
End: 2023-03-13

## 2023-04-18 ENCOUNTER — RESULT REVIEW (OUTPATIENT)
Age: 38
End: 2023-04-18

## 2023-04-18 ENCOUNTER — APPOINTMENT (OUTPATIENT)
Dept: GASTROENTEROLOGY | Facility: AMBULATORY MEDICAL SERVICES | Age: 38
End: 2023-04-18
Payer: MEDICAID

## 2023-04-18 PROCEDURE — 45380 COLONOSCOPY AND BIOPSY: CPT

## 2023-04-18 PROCEDURE — 43239 EGD BIOPSY SINGLE/MULTIPLE: CPT | Mod: 59

## 2023-07-25 ENCOUNTER — NON-APPOINTMENT (OUTPATIENT)
Age: 38
End: 2023-07-25

## 2023-10-30 ENCOUNTER — NON-APPOINTMENT (OUTPATIENT)
Age: 38
End: 2023-10-30

## 2024-02-24 ENCOUNTER — EMERGENCY (EMERGENCY)
Facility: HOSPITAL | Age: 39
LOS: 0 days | Discharge: HOME | End: 2024-02-24
Attending: EMERGENCY MEDICINE
Payer: MEDICAID

## 2024-02-24 VITALS
SYSTOLIC BLOOD PRESSURE: 117 MMHG | DIASTOLIC BLOOD PRESSURE: 74 MMHG | HEART RATE: 86 BPM | OXYGEN SATURATION: 97 % | TEMPERATURE: 98 F | RESPIRATION RATE: 18 BRPM

## 2024-02-24 VITALS — WEIGHT: 240.08 LBS | HEIGHT: 70 IN

## 2024-02-24 DIAGNOSIS — Z88.1 ALLERGY STATUS TO OTHER ANTIBIOTIC AGENTS STATUS: ICD-10-CM

## 2024-02-24 DIAGNOSIS — R06.02 SHORTNESS OF BREATH: ICD-10-CM

## 2024-02-24 DIAGNOSIS — M79.651 PAIN IN RIGHT THIGH: ICD-10-CM

## 2024-02-24 DIAGNOSIS — F12.90 CANNABIS USE, UNSPECIFIED, UNCOMPLICATED: ICD-10-CM

## 2024-02-24 DIAGNOSIS — R07.9 CHEST PAIN, UNSPECIFIED: ICD-10-CM

## 2024-02-24 DIAGNOSIS — M79.604 PAIN IN RIGHT LEG: ICD-10-CM

## 2024-02-24 DIAGNOSIS — R00.1 BRADYCARDIA, UNSPECIFIED: ICD-10-CM

## 2024-02-24 DIAGNOSIS — M62.838 OTHER MUSCLE SPASM: ICD-10-CM

## 2024-02-24 LAB
ALBUMIN SERPL ELPH-MCNC: 3.9 G/DL — SIGNIFICANT CHANGE UP (ref 3.3–5)
ALP SERPL-CCNC: 84 U/L — SIGNIFICANT CHANGE UP (ref 40–120)
ALT FLD-CCNC: 25 U/L — SIGNIFICANT CHANGE UP (ref 12–78)
ANION GAP SERPL CALC-SCNC: 1 MMOL/L — LOW (ref 5–17)
AST SERPL-CCNC: 18 U/L — SIGNIFICANT CHANGE UP (ref 15–37)
BASOPHILS # BLD AUTO: 0.04 K/UL — SIGNIFICANT CHANGE UP (ref 0–0.2)
BASOPHILS NFR BLD AUTO: 0.6 % — SIGNIFICANT CHANGE UP (ref 0–2)
BILIRUB SERPL-MCNC: 0.3 MG/DL — SIGNIFICANT CHANGE UP (ref 0.2–1.2)
BUN SERPL-MCNC: 15 MG/DL — SIGNIFICANT CHANGE UP (ref 7–23)
CALCIUM SERPL-MCNC: 8.5 MG/DL — SIGNIFICANT CHANGE UP (ref 8.5–10.1)
CHLORIDE SERPL-SCNC: 108 MMOL/L — SIGNIFICANT CHANGE UP (ref 96–108)
CO2 SERPL-SCNC: 33 MMOL/L — HIGH (ref 22–31)
CREAT SERPL-MCNC: 1.01 MG/DL — SIGNIFICANT CHANGE UP (ref 0.5–1.3)
EGFR: 98 ML/MIN/1.73M2 — SIGNIFICANT CHANGE UP
EOSINOPHIL # BLD AUTO: 0.27 K/UL — SIGNIFICANT CHANGE UP (ref 0–0.5)
EOSINOPHIL NFR BLD AUTO: 4.3 % — SIGNIFICANT CHANGE UP (ref 0–6)
GLUCOSE SERPL-MCNC: 95 MG/DL — SIGNIFICANT CHANGE UP (ref 70–99)
HCT VFR BLD CALC: 43.6 % — SIGNIFICANT CHANGE UP (ref 39–50)
HGB BLD-MCNC: 14.6 G/DL — SIGNIFICANT CHANGE UP (ref 13–17)
IMM GRANULOCYTES NFR BLD AUTO: 0.5 % — SIGNIFICANT CHANGE UP (ref 0–0.9)
LYMPHOCYTES # BLD AUTO: 1.43 K/UL — SIGNIFICANT CHANGE UP (ref 1–3.3)
LYMPHOCYTES # BLD AUTO: 22.6 % — SIGNIFICANT CHANGE UP (ref 13–44)
MCHC RBC-ENTMCNC: 28.6 PG — SIGNIFICANT CHANGE UP (ref 27–34)
MCHC RBC-ENTMCNC: 33.5 GM/DL — SIGNIFICANT CHANGE UP (ref 32–36)
MCV RBC AUTO: 85.5 FL — SIGNIFICANT CHANGE UP (ref 80–100)
MONOCYTES # BLD AUTO: 0.6 K/UL — SIGNIFICANT CHANGE UP (ref 0–0.9)
MONOCYTES NFR BLD AUTO: 9.5 % — SIGNIFICANT CHANGE UP (ref 2–14)
NEUTROPHILS # BLD AUTO: 3.95 K/UL — SIGNIFICANT CHANGE UP (ref 1.8–7.4)
NEUTROPHILS NFR BLD AUTO: 62.5 % — SIGNIFICANT CHANGE UP (ref 43–77)
PLATELET # BLD AUTO: 177 K/UL — SIGNIFICANT CHANGE UP (ref 150–400)
POTASSIUM SERPL-MCNC: 4.4 MMOL/L — SIGNIFICANT CHANGE UP (ref 3.5–5.3)
POTASSIUM SERPL-SCNC: 4.4 MMOL/L — SIGNIFICANT CHANGE UP (ref 3.5–5.3)
PROT SERPL-MCNC: 7.1 GM/DL — SIGNIFICANT CHANGE UP (ref 6–8.3)
RBC # BLD: 5.1 M/UL — SIGNIFICANT CHANGE UP (ref 4.2–5.8)
RBC # FLD: 13.1 % — SIGNIFICANT CHANGE UP (ref 10.3–14.5)
SODIUM SERPL-SCNC: 142 MMOL/L — SIGNIFICANT CHANGE UP (ref 135–145)
TROPONIN I, HIGH SENSITIVITY RESULT: 4.46 NG/L — SIGNIFICANT CHANGE UP
WBC # BLD: 6.32 K/UL — SIGNIFICANT CHANGE UP (ref 3.8–10.5)
WBC # FLD AUTO: 6.32 K/UL — SIGNIFICANT CHANGE UP (ref 3.8–10.5)

## 2024-02-24 PROCEDURE — 93971 EXTREMITY STUDY: CPT | Mod: 26,RT

## 2024-02-24 PROCEDURE — 93971 EXTREMITY STUDY: CPT | Mod: RT

## 2024-02-24 PROCEDURE — 71045 X-RAY EXAM CHEST 1 VIEW: CPT | Mod: 26

## 2024-02-24 PROCEDURE — 71045 X-RAY EXAM CHEST 1 VIEW: CPT

## 2024-02-24 PROCEDURE — 80053 COMPREHEN METABOLIC PANEL: CPT

## 2024-02-24 PROCEDURE — 99285 EMERGENCY DEPT VISIT HI MDM: CPT | Mod: 25

## 2024-02-24 PROCEDURE — 36415 COLL VENOUS BLD VENIPUNCTURE: CPT

## 2024-02-24 PROCEDURE — 99285 EMERGENCY DEPT VISIT HI MDM: CPT

## 2024-02-24 PROCEDURE — 93010 ELECTROCARDIOGRAM REPORT: CPT

## 2024-02-24 PROCEDURE — 73552 X-RAY EXAM OF FEMUR 2/>: CPT | Mod: 26,RT

## 2024-02-24 PROCEDURE — 73552 X-RAY EXAM OF FEMUR 2/>: CPT | Mod: RT

## 2024-02-24 PROCEDURE — 84484 ASSAY OF TROPONIN QUANT: CPT

## 2024-02-24 PROCEDURE — 93005 ELECTROCARDIOGRAM TRACING: CPT

## 2024-02-24 PROCEDURE — 85025 COMPLETE CBC W/AUTO DIFF WBC: CPT

## 2024-02-24 RX ORDER — CYCLOBENZAPRINE HYDROCHLORIDE 10 MG/1
10 TABLET, FILM COATED ORAL ONCE
Refills: 0 | Status: COMPLETED | OUTPATIENT
Start: 2024-02-24 | End: 2024-02-24

## 2024-02-24 RX ORDER — CYCLOBENZAPRINE HYDROCHLORIDE 10 MG/1
1 TABLET, FILM COATED ORAL
Qty: 9 | Refills: 0
Start: 2024-02-24 | End: 2024-02-26

## 2024-02-24 RX ADMIN — CYCLOBENZAPRINE HYDROCHLORIDE 10 MILLIGRAM(S): 10 TABLET, FILM COATED ORAL at 13:32

## 2024-02-24 NOTE — ED ADULT TRIAGE NOTE - CHIEF COMPLAINT QUOTE
Pt presents to the ED c/o sharp right upper thigh pain, chest pain, and SOB this morning. HR 86 and SpO2 97% in triage. Denies PMH. No medication PTA. Pt sent for EKG.

## 2024-02-24 NOTE — ED STATDOCS - PHYSICAL EXAMINATION
Constitutional: NAD AAOx3  Eyes: EOMI, pupils equal  Head: Normocephalic atraumatic  Mouth: no airway obstruction  Cardiac: regular rate and rhythm   Resp: Lungs CTAB  GI: Abd s/nt/nd  Neuro: CN2-12 intact. No focal deficits.   Skin: No rashes

## 2024-02-24 NOTE — ED ADULT TRIAGE NOTE - NS ED TRIAGE AVPU SCALE
Alert-The patient is alert, awake and responds to voice. The patient is oriented to time, place, and person. The triage nurse is able to obtain subjective information.
[facility]

## 2024-02-24 NOTE — ED ADULT TRIAGE NOTE - GLASGOW COMA SCALE: SCORE, MLM
HPI   Chief Complaint   Patient presents with    Leg Pain     Pt reports BLE tingling.          HPI     53-year-old male patient with lower back pain presenting with heaviness in his legs bilaterally along with numbness that extends from his hip down and improved to only involve his calves this week; however, he had recurrence of numbness in his hips today.  He also endorses chest discomfort and describes feeling his heart and his throat.  He endorses having a sensation of his heart rate.  He states that he has tachycardia when he goes upstairs.  He describes having a recent peripheral edema in his legs this week states that it improved today.  He also has right shoulder numbness this week.  He does have a history of diabetes.  He says that his sugars used to be in the 200s and have improved to the 120s recently.          No data recorded                Patient History   Past Medical History:   Diagnosis Date    Dizziness and giddiness 01/31/2017    Lightheadedness    Impaired fasting glucose 01/31/2017    Abnormal fasting glucose    Lumbago with sciatica, right side 10/21/2020    Right-sided low back pain with sciatica    Other forms of dyspnea 11/13/2019    Dyspnea on exertion    Pain in right hip 08/16/2017    Right hip pain    Personal history of other diseases of the respiratory system 11/24/2017    History of deviated nasal septum    Personal history of other endocrine, nutritional and metabolic disease     History of diabetes mellitus     Past Surgical History:   Procedure Laterality Date    MR HEAD ANGIO WO IV CONTRAST  5/14/2021    MR HEAD ANGIO WO IV CONTRAST 5/14/2021 [facility]    MR NECK ANGIO WO IV CONTRAST  5/14/2021    MR NECK ANGIO WO IV CONTRAST 5/14/2021 [facility]    OTHER SURGICAL HISTORY  11/18/2021    Sinus surgery    OTHER SURGICAL HISTORY  11/18/2021    Rhinologic surgery     Family History   Problem Relation Name Age of Onset    Diabetes Mother      Alzheimer's disease  Mother's Sister      Tremor Paternal Grandmother       Social History     Tobacco Use    Smoking status: Every Day     Types: Cigarettes    Smokeless tobacco: Former   Substance Use Topics    Alcohol use: Never    Drug use: Never       Physical Exam   ED Triage Vitals [10/18/23 1224]   Temp Heart Rate Resp BP   36.6 °C (97.9 °F) 92 18 (!) 169/100      SpO2 Temp Source Heart Rate Source Patient Position   97 % Temporal Monitor Sitting      BP Location FiO2 (%)     Right arm --       Physical Exam    ED Course & MDM   ED Course as of 10/18/23 2253   Wed Oct 18, 2023   1434 MR lumbar spine wo IV contrast [SM]      ED Course User Index  [SM] Laxmi Manriquez MD         Diagnoses as of 10/18/23 2253   Neuropathic pain   Cauda equina compression (CMS/HCC)   Spinal stenosis of lumbosacral region       Medical Decision Making      MR lumbar spine wo IV contrast   Final Result   There is severe spinal canal stenosis at L2-L3 and L4-L5 secondary to   degenerative discogenic change as well as an element of epidural   lipomatosis at L2-L3. Clumping of the cauda equina at L4-L5 secondary   to the degree of severe stenosis.        Moderate bilateral neural foraminal narrowing at L4-L5 as well as   moderate to severe right neural foraminal narrowing at L5-S1.        MACRO:   None        Signed by: Moo Jain 10/18/2023 4:09 PM   Dictation workstation:   [id number]      XR chest 1 view   Final Result   No acute pulmonary pathology.   Signed by Jose Enrique Ramsey MD        Labs Reviewed   COMPREHENSIVE METABOLIC PANEL - Abnormal       Result Value    Glucose 112 (*)     Sodium 138      Potassium 4.2      Chloride 102      Bicarbonate 32      Anion Gap 8 (*)     Urea Nitrogen 26 (*)     Creatinine 0.96      eGFR >90      Calcium 9.4      Albumin 4.1      Alkaline Phosphatase 70      Total Protein 6.2 (*)     AST 31      Bilirubin, Total 0.7      ALT 56 (*)    B-TYPE NATRIURETIC PEPTIDE - Normal    BNP 16      Narrative:         <100 pg/mL - Heart failure unlikely  100-299 pg/mL - Intermediate probability of acute heart                  failure exacerbation. Correlate with clinical                  context and patient history.    >=300 pg/mL - Heart Failure likely. Correlate with clinical                  context and patient history.    BNP testing is performed using different testing methodology at [facility] than at other [facility]. Direct result comparisons should only be made within the same method.      SERIAL TROPONIN-INITIAL - Normal    Troponin I, High Sensitivity 5      Narrative:     Less than 99th percentile of normal range cutoff-  Female and children under 18 years old <14 ng/L; Male <21 ng/L: Negative  Repeat testing should be performed if clinically indicated.     Female and children under 18 years old 14-50 ng/L; Male 21-50 ng/L:  Consistent with possible cardiac damage and possible increased clinical   risk. Serial measurements may help to assess extent of myocardial damage.     >50 ng/L: Consistent with cardiac damage, increased clinical risk and  myocardial infarction. Serial measurements may help assess extent of   myocardial damage.      NOTE: Children less than 1 year old may have higher baseline troponin   levels and results should be interpreted in conjunction with the overall   clinical context.     NOTE: Troponin I testing is performed using a different   testing methodology at [facility] than at other   [facility]. Direct result comparisons should only   be made within the same method.   CBC - Normal    WBC 8.1      nRBC 0.0      RBC 5.58      Hemoglobin 16.0      Hematocrit 45.4      MCV 81      MCH 28.7      MCHC 35.2      RDW 12.3      Platelets 217      MPV 9.1     SERIAL TROPONIN, 1 HOUR - Normal    Troponin I, High Sensitivity 5      Narrative:     Less than 99th percentile of normal range cutoff-  Female and children under 18 years old <14 ng/L; Male <21 ng/L:  Negative  Repeat testing should be performed if clinically indicated.     Female and children under 18 years old 14-50 ng/L; Male 21-50 ng/L:  Consistent with possible cardiac damage and possible increased clinical   risk. Serial measurements may help to assess extent of myocardial damage.     >50 ng/L: Consistent with cardiac damage, increased clinical risk and  myocardial infarction. Serial measurements may help assess extent of   myocardial damage.      NOTE: Children less than 1 year old may have higher baseline troponin   levels and results should be interpreted in conjunction with the overall   clinical context.     NOTE: Troponin I testing is performed using a different   testing methodology at [facility] than at other   [facility]. Direct result comparisons should only   be made within the same method.   TROPONIN SERIES- (INITIAL, 1 HR)    Narrative:     The following orders were created for panel order Troponin Series, (0, 1 HR).  Procedure                               Abnormality         Status                     ---------                               -----------         ------                     Troponin I, High Sensiti...[[id number]]  Normal              Final result               Troponin, High Sensitivi...[[id number]]  Normal              Final result                 Please view results for these tests on the individual orders.       For his chest discomfort he underwent a ACS work-up with EKG, troponin, chest x-ray.  Chest x-ray was unremarkable.  For his heaviness in his legs and sensations of numbness he underwent a MRI of the lumbar spine for possibility of disc herniation or other lumbar pathology.  He did not have acute dyspnea and did not have unilateral swelling so DVT and PE were unlikely so ultrasound and D-dimer were not pursued. MRI showed spinal canal stenosis at L2-L3 and L4-L5 and Clumping of the cauda equina at L4-L5 bilateral neural foraminal narrowing at L4-L5   and moderate to severe right neural foraminal narrowing at L5-S1. Placed call to neurosurgery for the above. Discussed with Neurosurgery and NS is okay with having the patient follow up at the clinic. Provided Rx for mobic and prednisone with taper for spinal stenosis of lumbosacral region with cauda equina crowding causing neuropathic pain.    External Records Reviewed: I reviewed recent and relevant outside records including: none  Independent Interpretation of Studies: I independently interpreted: As above  Social Determinants Affecting Care: Diagnostic testing considered: As above    I reviewed the case with the attending ER physician. Patient and/or patient´s representative was counseled regarding labs, imaging, likely diagnosis, and plan.     Laxmi Manriquez MD MS  PGY-1, Emergency Medicine    The above documentation was completed with the use of speech recognition software. It may contain dictation errors secondary to limitations of the software.        Laxmi Manriquez MD  Resident  10/18/23 5264    The patient was seen by the resident/fellow.  I have personally performed a substantive portion of the encounter.  I have seen and examined the patient; agree with the workup, evaluation, MDM, management and diagnosis.  The care plan has been discussed with the resident; I have reviewed the resident’s note and agree with the documented findings.         Juan Barron,   10/21/23 9615     15

## 2024-02-24 NOTE — ED STATDOCS - CARE PROVIDER_API CALL
José Sorensen  Cardiovascular Disease  [address] [facility] [address]  [place] [address]  Phone: [phone]  Fax: [phone]  Follow Up Time:

## 2024-02-24 NOTE — ED STATDOCS - OBJECTIVE STATEMENT
39 y/o male w/ a PMHx of presents to the ED c/o intermittent sharp right upper thigh pain, CP, and SOB last night. Pt reports Sx came on suddenly last night and worsened into the morning, the CP and SOB has since improved but the thigh pain seems to be worsening, feels somewhat like a spasm. Denies any falls, injuries, or trauma. No other complaints at this time. No meds taken PTA. Allergies: Ceclor. PCP: Dr. Torres.

## 2024-02-24 NOTE — ED STATDOCS - CLINICAL SUMMARY MEDICAL DECISION MAKING FREE TEXT BOX
39 y/o w/ right leg pain, CP, concern for blood clot. Pt does not have risk factors, no immobilization, nonsmoker. Will obtain blood work, sono, and reassess. 37 y/o w/ right leg pain, CP, concern for blood clot. Pt does not have risk factors, no immobilization, nonsmoker. Will obtain blood work, sono, and reassess.              right upper thigh whiteout rash, erythema or abrasions.  EKG unremarkable.  Will obtain labs, pain management.  Alexa Alvares PA-C

## 2024-02-24 NOTE — ED STATDOCS - PATIENT PORTAL LINK FT
You can access the FollowMyHealth Patient Portal offered by [facility] by registering at the following website: [email or web address]. By joining [facility]’s FollowMyHealth portal, you will also be able to view your health information using other applications (apps) compatible with our system.

## 2024-02-24 NOTE — ED ADULT NURSE NOTE - OBJECTIVE STATEMENT
Pt presents to ED c/o right sided chest pain, right leg pain, and shortness of breath. Pt continues to c/o right leg pain, currently denying chest pain and shortness of breath. Denies chest pain, shortness of breath, palpitations, diaphoresis, headaches, fevers, dizziness, nausea, vomiting, diarrhea, or urinary symptoms at this time. IV established in left arm with a 20G placed by LPN, labs drawn and sent, call bell in reach, warm blanket provided, bed in lowest position, side rails up x2, MD evaluation in progress.

## 2024-02-24 NOTE — ED STATDOCS - NSFOLLOWUPINSTRUCTIONS_ED_ALL_ED_FT
Muscle Cramps and Spasms  Muscle cramps and spasms occur when a muscle or muscles tighten and you have no control over this tightening (involuntary muscle contraction). They are a common problem that can happen in any muscle. The most common place is in the calf muscles of the leg. There are a few ways that muscle cramps and spasms differ:  Muscle cramps are painful. They come and go and may last for a few seconds or up to 15 minutes. Muscle cramps are often more forceful and last longer than muscle spasms.  Muscle spasms may or may not be painful. They may last just a few seconds or last much longer.  Certain conditions, such as diabetes or Parkinson's disease, can make you more likely to have cramps or spasms. But in most cases, cramps and spasms are not caused by other conditions. Common causes include:  Overexertion. This is when you do more physical work or exercise than your body is ready for.  Overuse from doing the same movements too many times.  Staying in one position for too long.  Improper preparation, form, or technique when playing a sport or doing an activity.  Not enough water or other fluids in your body (dehydration).  Other causes may include:  Injury.  Side effects of some medicines.  Too few salts and minerals in your body (electrolytes), such as potassium and calcium. This could happen if you are taking water pills (diuretics) or if you are pregnant.  In many cases, the cause of muscle cramps or spasms is not known.    Follow these instructions at home:  Eating and drinking    Drink enough fluid to keep your pee (urine) pale yellow. This can help prevent cramps or spasms.  Eat a healthy diet that includes a lot of nutrients to help your muscles work. A healthy diet includes fruits and vegetables, lean protein, whole grains, and low-fat or nonfat dairy products.  Managing pain and stiffness    Bag of ice on a towel on the skin.  A heating pad for use on the affected area.  Try to massage, stretch, and relax the affected muscle. Do this for a few minutes at a time.  If told, put ice on the muscles. This may help if you are sore or have pain after a cramp or spasm.  Put ice in a plastic bag.  Place a towel between your skin and the bag.  Leave the ice on for 20 minutes, 2–3 times a day.  If told, apply heat to tight or tense muscles as often as told by your health care provider. Use the heat source that your provider recommends, such as a moist heat pack or a heating pad.  Place a towel between your skin and the heat source.  Leave the heat on for 20–30 minutes.  If your skin turns bright red, remove the ice or heat right away to prevent skin damage. The risk of damage is higher if you cannot feel pain, heat, or cold.  Take hot showers or baths to help relax tight muscles.  General instructions    If you are having cramps often, avoid intense exercise for a few days.  Take over-the-counter and prescription medicines only as told by your provider.  Watch for any changes in your symptoms.  Contact a health care provider if:  Your cramps or spasms get more severe or happen more often.  Your cramps or spasms do not get better over time.  This information is not intended to replace advice given to you by your health care provider. Make sure you discuss any questions you have with your health care provider..       Chest Pain    WHAT YOU NEED TO KNOW:    Chest pain can be caused by a range of conditions, from not serious to life-threatening. Chest pain can be a symptom of a digestive problem, such as acid reflux or a stomach ulcer. An anxiety attack or a strong emotion, such as anger, can also cause chest pain. Infection, inflammation, or a fracture in the bones or cartilage in your chest can cause pain or discomfort. Sometimes chest pain or pressure is caused by poor blood flow to your heart (angina). Chest pain may also be caused by life-threatening conditions such as a heart attack or blood clot in your lungs.     DISCHARGE INSTRUCTIONS:    Call 911 if:     You have any of the following signs of a heart attack:   Squeezing, pressure, or pain in your chest       and any of the following:   Discomfort or pain in your back, neck, jaw, stomach, or arm       Shortness of breath      Nausea or vomiting      Lightheadedness or a sudden cold sweat        Return to the emergency department if:     You have chest discomfort that gets worse, even with medicine.      You cough or vomit blood.       Your bowel movements are black or bloody.       You cannot stop vomiting, or it hurts to swallow.     Contact your healthcare provider if:     You have questions or concerns about your condition or care.        Medicines:     Medicines may be given to treat the cause of your chest pain. Examples include pain medicine, anxiety medicine, or medicines to increase blood flow to your heart.       Do not take certain medicines without asking your healthcare provider first. These include NSAIDs, herbal or vitamin supplements, or hormones (estrogen or progestin).       Take your medicine as directed. Contact your healthcare provider if you think your medicine is not helping or if you have side effects. Tell him or her if you are allergic to any medicine. Keep a list of the medicines, vitamins, and herbs you take. Include the amounts, and when and why you take them. Bring the list or the pill bottles to follow-up visits. Carry your medicine list with you in case of an emergency.    Follow up with your healthcare provider within 72 hours, or as directed: You may need to return for more tests to find the cause of your chest pain. You may be referred to a specialist, such as a cardiologist or gastroenterologist. Write down your questions so you remember to ask them during your visits.     Healthy living tips: The following are general healthy guidelines. If your chest pain is caused by a heart problem, your healthcare provider will give you specific guidelines to follow.    Do not smoke. Nicotine and other chemicals in cigarettes and cigars can cause lung and heart damage. Ask your healthcare provider for information if you currently smoke and need help to quit. E-cigarettes or smokeless tobacco still contain nicotine. Talk to your healthcare provider before you use these products.       Eat a variety of healthy, low-fat, low-salt foods. Healthy foods include fruits, vegetables, whole-grain breads, low-fat dairy products, beans, lean meats, and fish. Ask for more information about a heart healthy diet.      Drink plenty of water every day. Your body is made of mostly water. Water helps your body to control your temperature and blood pressure. Ask your healthcare provider how much water you should drink every day.      Ask about activity. Your healthcare provider will tell you which activities to limit or avoid. Ask when you can drive, return to work, and have sex. Ask about the best exercise plan for you.      Maintain a healthy weight. Ask your healthcare provider how much you should weigh. Ask him or her to help you create a weight loss plan if you are overweight.       Get the flu and pneumonia vaccines. All adults should get the influenza (flu) vaccine. Get it every year as soon as it becomes available. The pneumococcal vaccine is given to adults aged 65 years or older. The vaccine is given every 5 years to prevent pneumococcal disease, such as pneumonia.    If you have a stent:     Carry your stent card with you at all times.       Let all healthcare providers know that you have a stent.

## 2024-02-24 NOTE — ED STATDOCS - PROGRESS NOTE DETAILS
right upper thigh whiteout rash, erythema or abrasions.  EKG unremarkable.  Will obtain labs, pain management.  Alexa Alvares PA-C Pt. is a 38 year old male presenting with right upper anterior thigh pain.  Pt. states pain intermittent.  Pain started yesterday.  Pt. had CP and SOB yesterday.  Pain to thigh worsening.  It is described as "spasm". neg, injuries however he works as a .  No medications taken. PCP.  Pt. is a marijuana smoker:. Dr Lewis.

## 2024-03-01 NOTE — ED PROCEDURE NOTE - EBL
[facility] Outreach- Cervical Cancer Screening  Reviewed patient's chart, noted patient is due for CCS and annual exam w/ OBGYN per our records. Called patient, no answer, LVM w/ callback number to call and schedule an appt w/ OBGYN on site if she wishes.     
minimal

## 2024-03-13 ENCOUNTER — EMERGENCY (EMERGENCY)
Facility: HOSPITAL | Age: 39
LOS: 0 days | Discharge: HOME | End: 2024-03-13
Attending: EMERGENCY MEDICINE
Payer: COMMERCIAL

## 2024-03-13 VITALS
OXYGEN SATURATION: 98 % | HEART RATE: 65 BPM | TEMPERATURE: 98 F | SYSTOLIC BLOOD PRESSURE: 121 MMHG | DIASTOLIC BLOOD PRESSURE: 81 MMHG | RESPIRATION RATE: 18 BRPM

## 2024-03-13 VITALS — WEIGHT: 240.08 LBS | HEIGHT: 70 IN
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
## 2024-03-13 DIAGNOSIS — J45.909 UNSPECIFIED ASTHMA, UNCOMPLICATED: ICD-10-CM

## 2024-03-13 DIAGNOSIS — M54.50 LOW BACK PAIN, UNSPECIFIED: ICD-10-CM

## 2024-03-13 DIAGNOSIS — Z88.1 ALLERGY STATUS TO OTHER ANTIBIOTIC AGENTS STATUS: ICD-10-CM
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
## 2024-03-13 PROCEDURE — 99284 EMERGENCY DEPT VISIT MOD MDM: CPT

## 2024-03-13 PROCEDURE — 72131 CT LUMBAR SPINE W/O DYE: CPT | Mod: 26,MC

## 2024-03-13 PROCEDURE — 72131 CT LUMBAR SPINE W/O DYE: CPT | Mod: MC

## 2024-03-13 PROCEDURE — 99284 EMERGENCY DEPT VISIT MOD MDM: CPT | Mod: 25

## 2024-03-13 RX ORDER — OXYCODONE AND ACETAMINOPHEN 5; 325 MG/1; MG/1
1 TABLET ORAL
Qty: 12 | Refills: 0
Start: 2024-03-13 | End: 2024-03-16

## 2024-03-13 RX ORDER — DEXAMETHASONE 0.5 MG/5ML
10 ELIXIR ORAL ONCE
Refills: 0 | Status: COMPLETED | OUTPATIENT
Start: 2024-03-13 | End: 2024-03-13

## 2024-03-13 RX ORDER — LIDOCAINE 4 G/100G
1 CREAM TOPICAL ONCE
Refills: 0 | Status: COMPLETED | OUTPATIENT
Start: 2024-03-13 | End: 2024-03-13

## 2024-03-13 RX ORDER — CYCLOBENZAPRINE HYDROCHLORIDE 10 MG/1
1 TABLET, FILM COATED ORAL
Qty: 21 | Refills: 0
Start: 2024-03-13 | End: 2024-03-19

## 2024-03-13 RX ADMIN — LIDOCAINE 1 PATCH: 4 CREAM TOPICAL at 10:45

## 2024-03-13 RX ADMIN — Medication 10 MILLIGRAM(S): at 10:45

## 2024-03-13 NOTE — ED STATDOCS - PATIENT PORTAL LINK FT
You can access the FollowMyHealth Patient Portal offered by [facility] by registering at the following website: [email or web address]. By joining [facility]’s FollowMyHealth portal, you will also be able to view your health information using other applications (apps) compatible with our system.

## 2024-03-13 NOTE — ED ADULT TRIAGE NOTE - CHIEF COMPLAINT QUOTE
Pt presents to ER c/o lower back pain. Pt reports he picked up his daughter 2 days ago and felt a pop. Pt reports pain has been persistent. Gait steady. Pt took Percocet x 1 and Flexeril 10mg PTA at 0900

## 2024-03-13 NOTE — ED STATDOCS - OBJECTIVE STATEMENT
39 y/o male with a PMHx of asthma presents to the ED c/o lower back pain. Pt reports 2 days ago he was putting his daughter back into her crib, felt a pop and had onset of back pain. Denies b/b changes. Pt took Percocet and Flexeril at home which pt states was for an injury in the past. Nonsmoker. No EtOH use. No other complaints at this time. 37 y/o male with a PMHx of asthma presents to the ED c/o lower back pain. Pt reports 2 days ago he was putting his daughter back into her crib, felt a pop and had onset of back pain. Pain nonradiating. Denies b/b changes. Pt took Percocet and Flexeril at home which pt states was from a previous injury. Allergies: Ceclor. Nonsmoker. No EtOH use. No other complaints at this time.

## 2024-03-13 NOTE — ED ADULT NURSE NOTE - OBJECTIVE STATEMENT
Pt presents to the ED c/o low back pain. Pt reports picking up his daughter 2 days ago, felt a pop in his back, walking with steady gait.

## 2024-03-13 NOTE — ED STATDOCS - PROGRESS NOTE DETAILS
37 yo male with a PMH of asthma, L knee surgery presents with back pain x 2 days. Pt felt it when he bent forward to place his 40lb daughter back into the crib. Pt states he has been taking percocet and flexeril (that he had from his prior surgery) which has helped with pain but once the medication wears off the pain comes back more painful. 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 Denies radiation of pain into his legs, incontinence of urine or stool.   Pt states that he came in for imaging test to see what his baseline is and to see if anything happened form his injury.   CT, meds and reeval. -Olivier Xiong PA-C CT unremarkable. Discussed with pt no acute fracture or malalignment. Informed pt that he will stefany to f/u with spine and spoek with [facility] to set up that referral. WIll send pt shlomo on steroids, peroccet, and flexeril. -Olivier Xiong PA-C

## 2024-03-13 NOTE — ED STATDOCS - ATTENDING APP SHARED VISIT CONTRIBUTION OF CARE
I, Tressa Wallace MD,  performed the initial face to face bedside interview with this patient regarding history of present illness, review of symptoms and relevant past medical, social and family history.  I completed an independent physical examination.  I was the initial provider who evaluated this patient.   I personally saw the patient and performed a substantive portion of the visit including all aspects of the medical decision making.  I have signed out the follow up of any pending tests (i.e. labs, radiological studies) to the MICHELLE.  I have communicated the patient’s plan of care and disposition with the MICHELLE.  The history, relevant review of systems, past medical and surgical history, medical decision making, and physical examination was documented by the scribe in my presence and I attest to the accuracy of the documentation.
none

## 2024-03-13 NOTE — ED STATDOCS - CARE PROVIDER_API CALL
[facility]  Neurosurgery  [address]  [place] [address]  Phone: [phone]  Fax: [phone]  Follow Up Time:

## 2024-03-13 NOTE — ED STATDOCS - MUSCULOSKELETAL, MLM
range of motion is not limited and there is no muscle tenderness. range of motion is not limited and there is no muscle tenderness. No midline spinal tenderness. NVI

## 2024-03-13 NOTE — ED STATDOCS - CLINICAL SUMMARY MEDICAL DECISION MAKING FREE TEXT BOX
Differential diagnosis includes muscle strain vs herniated disc. Pt presents with low back pain s/p lifting daughter into crib. Plan: CT, pain control.

## 2024-03-27 ENCOUNTER — APPOINTMENT (OUTPATIENT)
Dept: NEUROSURGERY | Facility: CLINIC | Age: 39
End: 2024-03-27

## 2024-10-20 ENCOUNTER — EMERGENCY (EMERGENCY)
Facility: HOSPITAL | Age: 39
LOS: 0 days | Discharge: HOME | End: 2024-10-20
Attending: EMERGENCY MEDICINE
Payer: COMMERCIAL

## 2024-10-20 VITALS
DIASTOLIC BLOOD PRESSURE: 74 MMHG | OXYGEN SATURATION: 99 % | HEART RATE: 55 BPM | TEMPERATURE: 98 F | RESPIRATION RATE: 16 BRPM | SYSTOLIC BLOOD PRESSURE: 101 MMHG

## 2024-10-20 VITALS
HEART RATE: 66 BPM | RESPIRATION RATE: 18 BRPM | TEMPERATURE: 98 F | OXYGEN SATURATION: 99 % | SYSTOLIC BLOOD PRESSURE: 122 MMHG | WEIGHT: 231.26 LBS | DIASTOLIC BLOOD PRESSURE: 85 MMHG

## 2024-10-20 DIAGNOSIS — R10.31 RIGHT LOWER QUADRANT PAIN: ICD-10-CM

## 2024-10-20 DIAGNOSIS — Z88.1 ALLERGY STATUS TO OTHER ANTIBIOTIC AGENTS: ICD-10-CM

## 2024-10-20 DIAGNOSIS — R00.0 TACHYCARDIA, UNSPECIFIED: ICD-10-CM

## 2024-10-20 DIAGNOSIS — K59.00 CONSTIPATION, UNSPECIFIED: ICD-10-CM

## 2024-10-20 LAB
ABO RH CONFIRMATION: SIGNIFICANT CHANGE UP
ALBUMIN SERPL ELPH-MCNC: 3.6 G/DL — SIGNIFICANT CHANGE UP (ref 3.3–5)
ALP SERPL-CCNC: 68 U/L — SIGNIFICANT CHANGE UP (ref 40–120)
ALT FLD-CCNC: 27 U/L — SIGNIFICANT CHANGE UP (ref 12–78)
ANION GAP SERPL CALC-SCNC: 5 MMOL/L — SIGNIFICANT CHANGE UP (ref 5–17)
APPEARANCE UR: CLEAR — SIGNIFICANT CHANGE UP
APTT BLD: 32.9 SEC — SIGNIFICANT CHANGE UP (ref 24.5–35.6)
AST SERPL-CCNC: 18 U/L — SIGNIFICANT CHANGE UP (ref 15–37)
BASOPHILS # BLD AUTO: 0.03 K/UL — SIGNIFICANT CHANGE UP (ref 0–0.2)
BASOPHILS NFR BLD AUTO: 0.4 % — SIGNIFICANT CHANGE UP (ref 0–2)
BILIRUB SERPL-MCNC: 0.7 MG/DL — SIGNIFICANT CHANGE UP (ref 0.2–1.2)
BILIRUB UR-MCNC: NEGATIVE — SIGNIFICANT CHANGE UP
BLD GP AB SCN SERPL QL: SIGNIFICANT CHANGE UP
BUN SERPL-MCNC: 11 MG/DL — SIGNIFICANT CHANGE UP (ref 7–23)
CALCIUM SERPL-MCNC: 9.3 MG/DL — SIGNIFICANT CHANGE UP (ref 8.5–10.1)
CHLORIDE SERPL-SCNC: 107 MMOL/L — SIGNIFICANT CHANGE UP (ref 96–108)
CO2 SERPL-SCNC: 26 MMOL/L — SIGNIFICANT CHANGE UP (ref 22–31)
COLOR SPEC: YELLOW — SIGNIFICANT CHANGE UP
CREAT SERPL-MCNC: 1.01 MG/DL — SIGNIFICANT CHANGE UP (ref 0.5–1.3)
DIFF PNL FLD: NEGATIVE — SIGNIFICANT CHANGE UP
EGFR: 97 ML/MIN/1.73M2 — SIGNIFICANT CHANGE UP
EOSINOPHIL # BLD AUTO: 0.19 K/UL — SIGNIFICANT CHANGE UP (ref 0–0.5)
EOSINOPHIL NFR BLD AUTO: 2.6 % — SIGNIFICANT CHANGE UP (ref 0–6)
GLUCOSE SERPL-MCNC: 107 MG/DL — HIGH (ref 70–99)
GLUCOSE UR QL: NEGATIVE MG/DL — SIGNIFICANT CHANGE UP
HCT VFR BLD CALC: 42.3 % — SIGNIFICANT CHANGE UP (ref 39–50)
HGB BLD-MCNC: 14.3 G/DL — SIGNIFICANT CHANGE UP (ref 13–17)
IMM GRANULOCYTES NFR BLD AUTO: 0.3 % — SIGNIFICANT CHANGE UP (ref 0–0.9)
INR BLD: 0.99 RATIO — SIGNIFICANT CHANGE UP (ref 0.85–1.16)
KETONES UR-MCNC: NEGATIVE MG/DL — SIGNIFICANT CHANGE UP
LACTATE SERPL-SCNC: 1.1 MMOL/L — SIGNIFICANT CHANGE UP (ref 0.7–2)
LEUKOCYTE ESTERASE UR-ACNC: NEGATIVE — SIGNIFICANT CHANGE UP
LYMPHOCYTES # BLD AUTO: 1.07 K/UL — SIGNIFICANT CHANGE UP (ref 1–3.3)
LYMPHOCYTES # BLD AUTO: 14.8 % — SIGNIFICANT CHANGE UP (ref 13–44)
MCHC RBC-ENTMCNC: 28 PG — SIGNIFICANT CHANGE UP (ref 27–34)
MCHC RBC-ENTMCNC: 33.8 GM/DL — SIGNIFICANT CHANGE UP (ref 32–36)
MCV RBC AUTO: 82.8 FL — SIGNIFICANT CHANGE UP (ref 80–100)
MONOCYTES # BLD AUTO: 0.67 K/UL — SIGNIFICANT CHANGE UP (ref 0–0.9)
MONOCYTES NFR BLD AUTO: 9.2 % — SIGNIFICANT CHANGE UP (ref 2–14)
NEUTROPHILS # BLD AUTO: 5.27 K/UL — SIGNIFICANT CHANGE UP (ref 1.8–7.4)
NEUTROPHILS NFR BLD AUTO: 72.7 % — SIGNIFICANT CHANGE UP (ref 43–77)
NITRITE UR-MCNC: NEGATIVE — SIGNIFICANT CHANGE UP
PH UR: 8 — SIGNIFICANT CHANGE UP (ref 5–8)
PLATELET # BLD AUTO: 204 K/UL — SIGNIFICANT CHANGE UP (ref 150–400)
POTASSIUM SERPL-MCNC: 3.5 MMOL/L — SIGNIFICANT CHANGE UP (ref 3.5–5.3)
POTASSIUM SERPL-SCNC: 3.5 MMOL/L — SIGNIFICANT CHANGE UP (ref 3.5–5.3)
PROT SERPL-MCNC: 6.7 GM/DL — SIGNIFICANT CHANGE UP (ref 6–8.3)
PROT UR-MCNC: NEGATIVE MG/DL — SIGNIFICANT CHANGE UP
PROTHROM AB SERPL-ACNC: 11.4 SEC — SIGNIFICANT CHANGE UP (ref 9.9–13.4)
RBC # BLD: 5.11 M/UL — SIGNIFICANT CHANGE UP (ref 4.2–5.8)
RBC # FLD: 13.2 % — SIGNIFICANT CHANGE UP (ref 10.3–14.5)
SODIUM SERPL-SCNC: 138 MMOL/L — SIGNIFICANT CHANGE UP (ref 135–145)
SP GR SPEC: >1.03 — HIGH (ref 1–1.03)
UROBILINOGEN FLD QL: 0.2 MG/DL — SIGNIFICANT CHANGE UP (ref 0.2–1)
WBC # BLD: 7.25 K/UL — SIGNIFICANT CHANGE UP (ref 3.8–10.5)
WBC # FLD AUTO: 7.25 K/UL — SIGNIFICANT CHANGE UP (ref 3.8–10.5)

## 2024-10-20 PROCEDURE — 83605 ASSAY OF LACTIC ACID: CPT

## 2024-10-20 PROCEDURE — 81003 URINALYSIS AUTO W/O SCOPE: CPT

## 2024-10-20 PROCEDURE — 86850 RBC ANTIBODY SCREEN: CPT

## 2024-10-20 PROCEDURE — 96375 TX/PRO/DX INJ NEW DRUG ADDON: CPT

## 2024-10-20 PROCEDURE — 74177 CT ABD & PELVIS W/CONTRAST: CPT | Mod: MC

## 2024-10-20 PROCEDURE — 96376 TX/PRO/DX INJ SAME DRUG ADON: CPT

## 2024-10-20 PROCEDURE — 85025 COMPLETE CBC W/AUTO DIFF WBC: CPT

## 2024-10-20 PROCEDURE — 96374 THER/PROPH/DIAG INJ IV PUSH: CPT | Mod: XU

## 2024-10-20 PROCEDURE — 99284 EMERGENCY DEPT VISIT MOD MDM: CPT | Mod: 25

## 2024-10-20 PROCEDURE — 85730 THROMBOPLASTIN TIME PARTIAL: CPT

## 2024-10-20 PROCEDURE — 99285 EMERGENCY DEPT VISIT HI MDM: CPT

## 2024-10-20 PROCEDURE — 86901 BLOOD TYPING SEROLOGIC RH(D): CPT

## 2024-10-20 PROCEDURE — 74177 CT ABD & PELVIS W/CONTRAST: CPT | Mod: 26,MC

## 2024-10-20 PROCEDURE — 36415 COLL VENOUS BLD VENIPUNCTURE: CPT

## 2024-10-20 PROCEDURE — 87040 BLOOD CULTURE FOR BACTERIA: CPT

## 2024-10-20 PROCEDURE — 86900 BLOOD TYPING SEROLOGIC ABO: CPT

## 2024-10-20 PROCEDURE — 85610 PROTHROMBIN TIME: CPT

## 2024-10-20 PROCEDURE — 80053 COMPREHEN METABOLIC PANEL: CPT

## 2024-10-20 RX ORDER — SODIUM CHLORIDE 0.9 % (FLUSH) 0.9 %
1000 SYRINGE (ML) INJECTION ONCE
Refills: 0 | Status: COMPLETED | OUTPATIENT
Start: 2024-10-20 | End: 2024-10-20

## 2024-10-20 RX ORDER — ONDANSETRON HCL/PF 4 MG/2 ML
4 VIAL (ML) INJECTION ONCE
Refills: 0 | Status: COMPLETED | OUTPATIENT
Start: 2024-10-20 | End: 2024-10-20

## 2024-10-20 RX ORDER — MORPHINE SULFATE 30 MG/1
4 TABLET, FILM COATED, EXTENDED RELEASE ORAL ONCE
Refills: 0 | Status: DISCONTINUED | OUTPATIENT
Start: 2024-10-20 | End: 2024-10-20

## 2024-10-20 RX ORDER — HYDROMORPHONE HYDROCHLORIDE 1 MG/ML
1 INJECTION, SOLUTION INTRAMUSCULAR; INTRAVENOUS; SUBCUTANEOUS ONCE
Refills: 0 | Status: DISCONTINUED | OUTPATIENT
Start: 2024-10-20 | End: 2024-10-20

## 2024-10-20 RX ADMIN — Medication 1000 MILLILITER(S): at 10:38

## 2024-10-20 RX ADMIN — Medication 4 MILLIGRAM(S): at 13:27

## 2024-10-20 RX ADMIN — Medication 4 MILLIGRAM(S): at 10:38

## 2024-10-20 RX ADMIN — HYDROMORPHONE HYDROCHLORIDE 1 MILLIGRAM(S): 1 INJECTION, SOLUTION INTRAMUSCULAR; INTRAVENOUS; SUBCUTANEOUS at 10:38

## 2024-10-20 NOTE — ED ADULT NURSE NOTE - OBJECTIVE STATEMENT
Pt presents to ED c/o abdominal pain w/ nausea and vomiting. IV established in right arm with a 20G by ALEKS Ayala, labs drawn and sent, call bell in reach, warm blanket provided, bed in lowest position, side rails up x2, MD evaluation in progress.

## 2024-10-20 NOTE — ED ADULT TRIAGE NOTE - CCCP TRG CHIEF CMPLNT
[facility]    History and Physical - Hospitalist Service       Date of Admission:  10/28/2019    Assessment & Plan   Bia Bill is a 52 year old female admitted on 10/28/2019. She is being admitted for acute respiratory failure secondary to COPD exacerbation.    Acute respiratory failure with hypoxia and hypercapnia  O2 at 88% on 2L initially in the emergency department. VBG showing respiratory acidosis (pH 7.26 / pCO2 60 / bicarb 27). Tachypneic, afebrile, no leukocytosis. Chest x-ray with bilateral vascular congestion suggestive of CHF. However, clinically has an obvious COPD exacerbation as well. Patient was placed on BiPAP in the emergency department.  - Admit to ICU on BiPAP  - Address COPD and CHF as below  - Repeat morning VBG showing resolution / compensation of acidosis, but hypercapnia persisting - repeat VBG at noon    COPD exacerbation  Mild persistent asthma  COPD and asthma at baseline (PFT's 2013 showing obstructive lung disease with air trapping vs restrictive lung disease). Managed prior to admission with Dulera 200/5 bid and prn albuterol. Significant rhonchi and expiratory wheezes on exam. Afebrile, no leukocytosis. Was given Solumedrol and DuoNebs in the emergency department, started on Levaquin. Suspect she has a viral illness that precipitated the COPD exacerbation.  - Prednisone 60 mg daily  - Scheduled DuoNebs  - Prn albuterol nebs  - Start azithromycin 500 mg IV daily, stop levaquin  - Prior to admission Dulera continued (changed to Breo due to formulary)  - Respiratory virus panel and influenza pending  - Smoking cessation    Lymphedema, bilateral  Possible CHF exacerbation  Patient has diagnosis of lymphedema, but no formal diagnosis for heart failure. Echo March 2018 with mild LV hypertrophy, EF 60-65%. Lymphedema managed prior to admission with lasix 40 mg bid. Edema is reportedly stable compared to baseline. Weight appears to be up about 7 pounds.  Chest x-ray with evidence of vascular congestion.  - Daily weights  - Strict I&Os  - Hold oral lasix, change to 40 mg IV lasix bid  - No echo today, if not improving consider getting an echo    Hyperglycemia   Glucose elevated. No prior diabetes diagnosis, no recent A1c on record. Hyperglycemia possibly due to physiologic stress response and steroids, but possibly with undiagnosed diabetes mellitus as well.  - A1c pending  - Medium sliding scale insulin  - Hyper/hypoglycemia protocols    Personal history of DVT (deep vein thrombosis)  Long term use of anticoagulant therapy  DVT postpartum 1992 and again in 2004 due to decreased mobility. Managed prior to admission with coumadin, INR goal 2-3. Admit INR pending. Low concern for DVT/PE by exam/history.  - Pharmacy to manage coumadin dosing  - PCDs ordered    Erythrocytosis  Secondary polycythemia  Follows with Dr. Watts, last visit 9/18/19. Is following hematocrit / CBC monthly, phlebotomy as needed. Admit hematocrit normal (46.5).  - Monitor, CBC daily during hospitalization    HTN, goal below 140/90  Pressures reviewed, stable. Managed prior to admission with lasix 40 mg bid.  - Lasix as above    Hypothyroidism  Managed prior to admission with levothyroxine 150 mcg daily, continue.    PVD (peripheral vascular disease) with claudication  History of left lower extremity stents x 3. Is on Zocor 20 mg daily.  - Continue Zocor    Esophageal reflux  Managed prior to admission with omeprazole 20 mg bid, continue.    Polyneuropathy in other diseases classified elsewhere  RLS (restless legs syndrome)  Stable. Managed prior to admission with gabapentin (600 mg tid, 900 mg q hs), Mirapex 0.125 mg q hs, and oral dilaudid 1-2 mg q 3 hours prn.  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 Managed prior to admission with allopurinol 300 mg  daily, continue.    Moderate mixed bipolar I disorder  Personality disorder, depressive  Stable mood on admission. Managed prior to admission with Abilify 10 mg daily, Wellbutrin  mg daily, trazodone 150 mg q hs, and Lamictal 100 mg daily.  - Continue prior to admission Abilify, Wellbutrin, trazodone, and Lamictal    Smoker  Encourage cessation. Nicotine patch available.    GILES (Obstructive Sleep Apnea)-Moderate (AHI 16)  Uses CPAP at home.   - Transition to CPAP use after patient no longer needing BiPAP         Diet: Advance Diet as Tolerated: Regular Diet Adult    DVT Prophylaxis: Warfarin and Pneumatic Compression Devices  Goldsmith Catheter: not present  Code Status: DNR, may intubate    Disposition Plan   Expected discharge: 2+ days, recommended to prior living arrangement once respiratory status improves, no longer needing supplemental O2.  Entered: Sherita Hollis PA-C 10/29/2019, 10:09 AM     The patient's care was discussed with the Attending Physician, Dr. Dre Engle, Bedside Nurse, Patient and discussed during staff rounds.    Sherita Hollis PA-C  [facility]    ______________________________________________________________________    Chief Complaint   Shortness of breath, wheezing, cough    History is obtained from the patient, review of EMR, and emergency department documentation.    History of Present Illness   Bia Bill is a 52 year old female who presented to the emergency department for evaluation of severe shortness of breath.    Patient had cold symptoms including nasal congestion and a rhinorrhea. Two evenings ago she developed a productive cough with yellow sputum. She has been coughing a lot, causing chest pain and upper abdominal pain when she coughs (pain is absent if not coughing). She reports subjective fevers at home but did not measure her temperature, denies chills.     Along with the cough, she has been short of breath and has  "increased wheezing. She tried her albuterol x 3 without improvement. She reports orthopnea and paroxysmal nocturnal dyspnea. She has chronic edema managed with lasix, but reports her edema is stable compared to baseline. She thinks she may have gained weight recently but is not sure.     She has bilateral leg cramps recently, but stable compared to baseline, feels different than her prior DVT calf pain. This morning she has a headache. She has been dizzy with her current symptoms but no syncope or falls. She some subjective leg numbness recently, but this is not demonstrated on exam. She feels she has been somewhat \"foggy\" mentally since onset of her symptoms, but no overt confusion or altered mental status.    Review of Systems    The 10 point Review of Systems is negative other than noted in the HPI or here.     Past Medical History    I have reviewed this patient's medical history and updated it with pertinent information if needed.   Past Medical History:   Diagnosis Date     Acromioclavicular joint arthritis 1/25/2017     Acute bronchospasm 8/15/2016     Acute gouty arthritis 8/4/2014     Anxiety state, unspecified      Bipolar I disorder, most recent episode (or current) unspecified      Closed fracture of metatarsal bone 6/5/2007     Depressive disorder, not elsewhere classified      DVT, lower extremity (H)      Headache 10/17/2014     Impingement syndrome of shoulder region 1/25/2017     Polycythemia, secondary     Rosie HGB      Right shoulder pain 10/14/2014     Trochanteric bursitis of both hips 1/11/2016       Past Surgical History   I have reviewed this patient's surgical history and updated it with pertinent information if needed.  Past Surgical History:   Procedure Laterality Date     BONE MARROW BIOPSY, BONE SPECIMEN, NEEDLE/TROCAR N/A 11/17/2014    Procedure: BIOPSY BONE MARROW;  Surgeon: Hay Aaron MD;  Location: [facility]     D & C  10/26/09    with uterine ablation     " abdominal pain ENDOVASCULAR PLACEMENT VASCULAR DEVICE Left     Left leg stent x 3     ESOPHAGOSCOPY, GASTROSCOPY, DUODENOSCOPY (EGD), COMBINED  4/21/2014    Procedure: Gastroscopy;  Surgeon: Moris Thomas MD;  Location: [facility]     HYSTERECTOMY, PAP NO LONGER INDICATED  1-4-2010     LAPAROSCOPIC CHOLECYSTECTOMY N/A 1/4/2019    Procedure: Laparoscopic cholecystectomy;  Surgeon: Aaron Siegel MD;  Location: [facility]     LITHOTRIPSY  2004    Lithotrypsy       Social History   I have reviewed this patient's social history and updated it with pertinent information if needed.  Social History     Tobacco Use     Smoking status: Current Every Day Smoker     Packs/day: 0.50     Years: 34.00     Pack years: 17.00     Types: Cigarettes     Smokeless tobacco: Never Used     Tobacco comment: started at age 10.  Quit during pregnancyX4.  Quit 3 months ago.    Substance Use Topics     Alcohol use: No     Alcohol/week: 0.0 standard drinks     Comment: quit 1995     Drug use: No     Comment: past hx 22 years ago/ uppers downers, canibus       Family History   I have reviewed this patient's family history and updated it with pertinent information if needed.   Family History   Problem Relation Age of Onset     Hypertension Mother      Diabetes Mother      Blood Disease Mother      Heart Disease Mother         chf     Cerebrovascular Disease Mother      Hypertension Father      Cancer Father         lung cancer     Allergies Sister      Diabetes Sister      Depression Sister      Hypertension Sister        Prior to Admission Medications   Prior to Admission Medications   Prescriptions Last Dose Informant Patient Reported? Taking?   ARIPiprazole (ABILIFY) 10 MG tablet 10/27/2019 at 1500  No Yes   Sig: TAKE ONE TABLET BY MOUTH EVERY DAY   Patient taking differently: Take 10 mg by mouth every evening    EPINEPHrine (EPIPEN/ADRENACLICK/OR ANY BX GENERIC EQUIV) 0.3 MG/0.3ML injection 2-pack 0 this year  No No   Sig: Inject 0.3 mLs (0.3 mg) into the  muscle once as needed for anaphylaxis   Patient not taking: Reported on 9/18/2019   HYDROmorphone (DILAUDID) 2 MG tablet Past Month at none left  No Yes   Sig: Take 0.5-1 tablets (1-2 mg) by mouth every 3 hours as needed for moderate to severe pain   VITAMIN D3 1000 units tablet 10/28/2019 at pm  No Yes   Sig: TAKE ONE TABLET BY MOUTH ONCE DAILY   Patient taking differently: Take 1,000 Units by mouth daily    acetaminophen (TYLENOL) 500 MG tablet 10/25/2019 Self Yes Yes   Sig: Take 500-1,000 mg by mouth every 6 hours as needed for mild pain   albuterol (2.5 MG/3ML) 0.083% neb solution More than a month at machine broken Self No No   Sig: Take 1 vial (2.5 mg) by nebulization every 6 hours as needed for shortness of breath / dyspnea or wheezing   albuterol (PROAIR HFA/PROVENTIL HFA/VENTOLIN HFA) 108 (90 BASE) MCG/ACT Inhaler 10/28/2019 at 1500 Self No Yes   Sig: Inhale 2 puffs into the lungs every 6 hours as needed for shortness of breath / dyspnea or wheezing   allopurinol (ZYLOPRIM) 300 MG tablet 10/27/2019 at am Self No Yes   Sig: Take 1 tablet (300 mg) by mouth daily   buPROPion (WELLBUTRIN XL) 150 MG 24 hr tablet 10/27/2019 at 1500  No Yes   Sig: TAKE ONE TABLET BY MOUTH EVERY DAY IN THE EVENING   Patient taking differently: Take 150 mg by mouth every evening TAKE ONE TABLET BY MOUTH EVERY DAY IN THE EVENING   furosemide (LASIX) 20 MG tablet 10/27/2019 at 1500 Self No Yes   Sig: TAKE TWO TABLETS BY MOUTH TWICE DAILY   Patient taking differently: Take 40 mg by mouth 2 times daily    gabapentin (NEURONTIN) 300 MG capsule 10/27/2019 at pm Self No Yes   Sig: TAKE ONE CAPSULE BY MOUTH ONCE DAILY AT BEDTIME WITH A 600 MG TABLET FOR A TOTAL DOSE  MG   Patient taking differently: Take 300 mg by mouth At Bedtime TAKE ONE CAPSULE BY MOUTH ONCE DAILY AT BEDTIME WITH A 600 MG TABLET FOR A TOTAL DOSE  MG   gabapentin (NEURONTIN) 600 MG tablet dc-old script duplicate Self No No   Sig: TAKE ONE TABLET BY MOUTH THREE  TIMES A DAY   Patient taking differently: Take 600 mg by mouth 3 times daily    gabapentin (NEURONTIN) 600 MG tablet 10/28/2019 at 1500  No Yes   Sig: TAKE ONE TABLET BY MOUTH THREE TIMES A DAY   Patient taking differently: Take 600 mg by mouth 3 times daily TAKE ONE TABLET BY MOUTH THREE TIMES A DAY   ipratropium - albuterol 0.5 mg/2.5 mg/3 mL (DUONEB) 0.5-2.5 (3) MG/3ML neb solution More than a month at machine broken Self No No   Sig: Take 1 vial (3 mLs) by nebulization every 6 hours as needed for shortness of breath / dyspnea or wheezing   lamoTRIgine (LAMICTAL) 100 MG tablet 10/27/2019 at am  No Yes   Sig: Take 1 tablet (100 mg) by mouth daily   levothyroxine (SYNTHROID/LEVOTHROID) 150 MCG tablet 10/28/2019 at am Self No Yes   Sig: Take 1 tablet (150 mcg) by mouth daily   mometasone-formoterol (DULERA) 200-5 MCG/ACT oral inhaler 10/28/2019 at am Self No Yes   Sig: Inhale 2 puffs into the lungs 2 times daily   omeprazole (PRILOSEC) 20 MG DR capsule 10/28/2019 at am Self No Yes   Sig: TAKE ONE CAPSULE BY MOUTH TWICE A DAY   Patient taking differently: Take 20 mg by mouth 2 times daily    order for DME  Self No No   Sig: Equipment being ordered: INCONTINENCE PADS   QID   order for DME  Self No No   Sig: Equipment being ordered: DEPENDS SIZE LARGE   order for DME  Self Yes No   Sig: Equipment being ordered: CPAP  AIRSENSE 10  5-18 CM H20  # [id number]   DN# [id number]   order for DME broken Self No No   Sig: Equipment being ordered: Nebulizer   order for DME  Self No No   Sig: Equipment being ordered:x2 [employer] 30/40mmHg compression wraps with x2 extra prs of compression liners   potassium chloride ER (K-DUR/KLOR-CON M) 10 MEQ CR tablet 10/28/2019 at am  No Yes   Sig: TAKE ONE TABLET BY MOUTH ONCE DAILY   Patient taking differently: Take 10 mEq by mouth daily    pramipexole (MIRAPEX) 0.125 MG tablet 10/27/2019 at pm  No Yes   Sig: TAKE 1-2 TABLETS BY MOUTH AT BEDTIME   simvastatin (ZOCOR) 20 MG tablet 10/27/2019 at pm   No Yes   Sig: TAKE ONE TABLET BY MOUTH EVERY NIGHT AT BEDTIME   Patient taking differently: Take 20 mg by mouth At Bedtime    traZODone (DESYREL) 150 MG tablet 10/27/2019 at 2200  No Yes   Sig: Take 1 tablet (150 mg) by mouth At Bedtime   warfarin (JANTOVEN) 5 MG tablet 10/28/2019 at 1500  Yes Yes   Sig: 10 mg (5 mg x 2) every day or as directed by the Anticoagulation Clinic   warfarin ANTICOAGULANT (JANTOVEN ANTICOAGULANT) 5 MG tablet   Yes No   Si.5 mg (5 mg x 2.5) every Mon, Fri; 15 mg (5 mg x 3) all other days OR AS DIRECTED BY [facility]      Facility-Administered Medications: None     Allergies   Allergies   Allergen Reactions     Darvocet [Propoxyphene N-Apap] Anaphylaxis     Darvocet,Percocet      Percocet [Oxycodone-Acetaminophen] Anaphylaxis, Hives and Swelling     Has tolerated hydromorphone in the past.      Asa [Aspirin] Hives     Aspirin causes seizures and hives, throat swelling.   Has tolerated ketorolac in the past.        Bee      Ibuprofen Swelling     Throat swelling per patient      Lyrica [Pregabalin] Other (See Comments) and Swelling     dizziness     Povidone Iodine Rash     Reaction to topical betadine     Tape [Adhesive Tape] Rash       Physical Exam   Vital Signs: Temp: 98.8  F (37.1  C) Temp src: Axillary BP: 135/66 Pulse: 83 Heart Rate: 80 Resp: 28 SpO2: 93 % O2 Device: Nasal cannula Oxygen Delivery: 4 LPM  Weight: 244 lbs 11.37 oz    Constitutional: Alert, oriented, cooperative. No apparent distress, appears nontoxic. Attempts to speak in full sentences, but difficult due to presence of BiPAP mask.    Eyes: Eyes are clear, pupils are reactive. No scleral icterus. Extroccular movements intact.     HEENT: Oropharynx appears to be clear and moist when visualized through BiPAP mask, no lesions. Normocephalic, no evidence of cranial trauma.      Cardiovascular: Difficult to auscultate over sound of BiPAP and rhonchi. Appears to have regular rhythm and rate, normal S1 and S2. No murmur, rubs,  or gallops. Peripheral pulses intact bilaterally. Bilateral +1 lower extremity edema.    Respiratory: On BiPAP. Extensive rhonchi in all lung fields with expiratory wheezing. No crackles appreciated.     GI: Soft, non-distended. Reports having tenderness to palpation of all abdominal quadrants, but no rebound or guarding. No hepatosplenomegaly or masses appreciated. Normal bowel sounds.     Musculoskeletal: Without obvious deformity, normal range of motion. Distal CMS intact.      Skin: Warm and dry, no rashes or ecchymoses. No mottling of skin.      Neurologic: Patient moves all extremities. Cranial nerves are grossly intact.  is symmetric. Gross strength and sensation are equal bilaterally.    Genitourinary: Deferred      Data   Data reviewed today: I reviewed all medications, new labs and imaging results over the last 24 hours. I personally reviewed the chest x-ray image(s) showing increased pulmonary vascularity. EKG showing normal sinus rhythm.    Recent Labs   Lab 10/29/19  0905 10/29/19  0822 10/28/19  2052 10/23/19  0849   WBC  --  7.5 8.1 6.3   HGB  --  13.6 13.5 15.3   MCV  --  93 92 91   PLT  --  116* 128* 108*   INR 3.84*  --   --  1.65*   NA  --  136 140  --    POTASSIUM  --  4.1 3.2*  --    CHLORIDE  --  101 102  --    CO2  --  32 32  --    BUN  --  13 11  --    CR  --  0.69 0.63  --    ANIONGAP  --  3 6  --    HOOD  --  8.5 8.1*  --    GLC  --  193* 139*  --    ALBUMIN  --  3.2* 3.2*  --    PROTTOTAL  --  6.5* 6.2*  --    BILITOTAL  --  0.5 0.7  --    ALKPHOS  --  118 114  --    ALT  --  42 36  --    AST  --  21 21  --    TROPI  --   --  <0.015  --      Recent Results (from the past 24 hour(s))   XR Chest 2 Views    Narrative    CHEST TWO VIEWS      10/28/2019 9:50 PM     HISTORY: Shortness of breath.  Wheeze, cough.    COMPARISON: 3/22/2018.      Impression    IMPRESSION: Stable cardiomegaly. Diffuse bilateral vascular congestion  appears increased. This may represent edema and CHF. No effusions.      GIGI GARCIA MD

## 2024-10-20 NOTE — ED PROVIDER NOTE - NS_EDPROVIDERDISPOUSERTYPE_ED_A_ED
Attending Attestation (For Attendings USE Only)... Doxycycline Counseling:  Patient counseled regarding possible photosensitivity and increased risk for sunburn.  Patient instructed to avoid sunlight, if possible.  When exposed to sunlight, patients should wear protective clothing, sunglasses, and sunscreen.  The patient was instructed to call the office immediately if the following severe adverse effects occur:  hearing changes, easy bruising/bleeding, severe headache, or vision changes.  The patient verbalized understanding of the proper use and possible adverse effects of doxycycline.  All of the patient's questions and concerns were addressed.

## 2024-10-20 NOTE — ED ADULT NURSE REASSESSMENT NOTE - NS ED NURSE REASSESS COMMENT FT1
Pt resting comfortably in bed, currently c/o nausea and requesting meds, MD Smith made aware, verbal order given for 4mg of zofran IVP at this time.

## 2024-10-20 NOTE — ED PROVIDER NOTE - OBJECTIVE STATEMENT
39M here with worsening lower abd pain x 24h. No fever/chills. No prior surgeries.  No urinary c/o. "Like a blowtorch", "can't get comfortable"

## 2024-10-20 NOTE — ED PROVIDER NOTE - PATIENT PORTAL LINK FT
You can access the FollowMyHealth Patient Portal offered by [facility] by registering at the following website: [email or web address]. By joining [facility]’s FollowMyHealth portal, you will also be able to view your health information using other applications (apps) compatible with our system.

## 2024-10-20 NOTE — ED ADULT TRIAGE NOTE - CHIEF COMPLAINT QUOTE
Pt ambulatory to ED c/o RLQ abdominal pain. Pt reports pain x10 hours with no relief from medications. Pt took advil, reports symptoms got worse. Pt unable to tolerate, hunched over in pain in triage. Pt states pain has gotten progressively worse since last night, has concerns for ruptured appendix. Denies PMHx.

## 2024-10-20 NOTE — ED PROVIDER NOTE - NSFOLLOWUPINSTRUCTIONS_ED_ALL_ED_FT
Follow-up with your regular physician  Return with any persistent/worsening symptoms.     Abdominal Pain    Many things can cause abdominal pain. Many times, abdominal pain is not caused by a disease and will improve without treatment. Your health care provider will do a physical exam to determine if there is a dangerous cause of your pain; blood tests and imaging may help determine the cause of your pain. However, in many cases, no cause may be found and you may need further testing as an outpatient. Monitor your abdominal pain for any changes.     SEEK IMMEDIATE MEDICAL CARE IF YOU HAVE ANY OF THE FOLLOWING SYMPTOMS: worsening abdominal pain, uncontrollable vomiting, profuse diarrhea, inability to have bowel movements or pass gas, black or bloody stools, fever accompanying chest pain or back pain, or fainting. These symptoms may represent a serious problem that is an emergency. Do not wait to see if the symptoms will go away. Get medical help right away. Call 911 and do not drive yourself to the hospital.     Constipation, Adult    Constipation is when a person has fewer bowel movements in a week than normal, has difficulty having a bowel movement, or has stools that are dry, hard, or larger than normal. Constipation may be caused by an underlying condition. It may become worse with age if a person takes certain medicines and does not take in enough fluids.    Follow these instructions at home:  Eating and drinking     Eat foods that have a lot of fiber, such as fresh fruits and vegetables, whole grains, and beans.  Limit foods that are high in fat, low in fiber, or overly processed, such as french fries, hamburgers, cookies, candies, and soda.  Drink enough fluid to keep your urine clear or pale yellow.    General instructions    Exercise regularly or as told by your health care provider.  Go to the restroom when you have the urge to go. Do not hold it in.  Take over-the-counter and prescription medicines only as told by your health care provider. These include any fiber supplements.  Practice pelvic floor retraining exercises, such as deep breathing while relaxing the lower abdomen and pelvic floor relaxation during bowel movements.  Watch your condition for any changes.  Keep all follow-up visits as told by your health care provider. This is important.    Contact a health care provider if:  You have pain that gets worse.  You have a fever.  You do not have a bowel movement after 4 days.  You vomit.  You are not hungry.  You lose weight.  You are bleeding from the anus.  You have thin, pencil-like stools.    Get help right away if:  You have a fever and your symptoms suddenly get worse.  You leak stool or have blood in your stool.  Your abdomen is bloated.  You have severe pain in your abdomen.  You feel dizzy or you faint.    ADDITIONAL NOTES AND INSTRUCTIONS    Please follow up with your Primary MD in 24-48 hr.  Seek immediate medical care for any new/worsening signs or symptoms.

## 2024-10-20 NOTE — ED PROVIDER NOTE - PROGRESS NOTE DETAILS
Pain resolved. Labs unremarkable; CT with moderate stool right colon.   Patient updated - Will d/c with stool softeners

## 2024-10-20 NOTE — ED ADULT NURSE NOTE - NSFALLRISKINTERV_ED_ALL_ED
Assistance OOB with selected safe patient handling equipment if applicable/Assistance with ambulation/Communicate fall risk and risk factors to all staff, patient, and family/Encourage patient to sit up slowly, dangle for a short time, stand at bedside before walking/Orthostatic vital signs/Provide visual cue: yellow wristband, yellow gown, etc/Reinforce activity limits and safety measures with patient and family/Review medications for side effects contributing to fall risk/Toileting schedule using arm’s reach rule for commode and bathroom/Call bell, personal items and telephone in reach/Instruct patient to call for assistance before getting out of bed/chair/stretcher/Non-slip footwear applied when patient is off stretcher/[place] to call system/Physically safe environment - no spills, clutter or unnecessary equipment/Purposeful Proactive Rounding/Room/bathroom lighting operational, light cord in reach

## 2024-10-25 LAB
CULTURE RESULTS: SIGNIFICANT CHANGE UP
CULTURE RESULTS: SIGNIFICANT CHANGE UP
SPECIMEN SOURCE: SIGNIFICANT CHANGE UP
SPECIMEN SOURCE: SIGNIFICANT CHANGE UP